# Patient Record
Sex: MALE | URBAN - METROPOLITAN AREA
[De-identification: names, ages, dates, MRNs, and addresses within clinical notes are randomized per-mention and may not be internally consistent; named-entity substitution may affect disease eponyms.]

---

## 2023-10-16 ENCOUNTER — OFFICE VISIT (OUTPATIENT)
Dept: ORTHOPEDICS | Facility: CLINIC | Age: 38
End: 2023-10-16
Payer: COMMERCIAL

## 2023-10-16 ENCOUNTER — TELEPHONE (OUTPATIENT)
Dept: ORTHOPEDICS | Facility: CLINIC | Age: 38
End: 2023-10-16

## 2023-10-16 DIAGNOSIS — S64.40XA LACERATION OF DIGITAL NERVE OF FINGER, INITIAL ENCOUNTER: Primary | ICD-10-CM

## 2023-10-16 PROCEDURE — 99203 OFFICE O/P NEW LOW 30 MIN: CPT | Mod: S$GLB,,, | Performed by: ORTHOPAEDIC SURGERY

## 2023-10-16 PROCEDURE — 99999 PR PBB SHADOW E&M-NEW PATIENT-LVL II: CPT | Mod: PBBFAC,,, | Performed by: ORTHOPAEDIC SURGERY

## 2023-10-16 PROCEDURE — 99203 PR OFFICE/OUTPT VISIT, NEW, LEVL III, 30-44 MIN: ICD-10-PCS | Mod: S$GLB,,, | Performed by: ORTHOPAEDIC SURGERY

## 2023-10-16 PROCEDURE — 99999 PR PBB SHADOW E&M-NEW PATIENT-LVL II: ICD-10-PCS | Mod: PBBFAC,,, | Performed by: ORTHOPAEDIC SURGERY

## 2023-10-16 RX ORDER — MUPIROCIN 20 MG/G
OINTMENT TOPICAL
Status: CANCELLED | OUTPATIENT
Start: 2023-10-16

## 2023-10-16 NOTE — TELEPHONE ENCOUNTER
----- Message from Sejal Slater sent at 10/16/2023  2:14 PM CDT -----  Contact: pt [phone]  Type: Needs Medical Advice  Who Called:  Pts     Best Call Back Number: [phone]    Additional Information: Pt asking if you can pls check insurance to make sure his surgery is approved. 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 Pls call back and advise

## 2023-10-16 NOTE — H&P (VIEW-ONLY)
10/16/2023    Chief Complaint:  Chief Complaint   Patient presents with    Left Hand - Injury, Pain       HPI:  Elieser Kumra is a 37 y.o. male, who presents to clinic today has a history of a laceration to his left small finger.  He was noted have numbness on 1 side of the finger and therefore he presents to me today.  He can flex and extend the finger.  He did not have any other injuries or other complaints at this time.    PMHX:  No past medical history on file.    PSHX:  No past surgical history on file.    FMHX:  No family history on file.    SOCHX:  Social History     Tobacco Use    Smoking status: Not on file    Smokeless tobacco: Not on file   Substance Use Topics    Alcohol use: Not on file       ALLERGIES:  Patient has no known allergies.    CURRENT MEDICATIONS:  No current outpatient medications on file prior to visit.     No current facility-administered medications on file prior to visit.       REVIEW OF SYSTEMS:  ROS    GENERAL PHYSICAL EXAM:   There were no vitals taken for this visit.   GEN: well developed, well nourished, no acute distress   HENT: Normocephalic, atraumatic   EYES: No discharge, conjunctiva normal   NECK: Supple, non-tender   PULM: No wheezing, no respiratory distress   CV: RRR   ABD: Soft, non-tender    ORTHO EXAM:   Examination of the left hand and small finger reveals that there is a 1.5-2 cm laceration at the base of the small finger on the palmar side.  There are sutures in place.  There is only minimal edema.  There is no erythema or drainage.  Distally has very limited to no sensation over the ulnar side of the digit.  The radial sided sensation is intact.  He is able to flex and extend at both of the IP joints.  He does have capillary refill less than 2 seconds at the tip of the finger.    RADIOLOGY:   None    ASSESSMENT:   Left small finger laceration with digital nerve laceration    PLAN:  1. I have discussed treatment options.  I have discussed observation versus  exploration of the wound possible nerve repair versus grafting.  After discussion of the risks and benefits of the procedure informed consent has been obtained     2. Will proceed with left hand and small finger wound exploration with nerve repair under general anesthesia     3. He will follow up 2 weeks postoperatively

## 2023-10-16 NOTE — TELEPHONE ENCOUNTER
Returned call to pt, informed we will put the case request in and let our prior auth department work on case. Pt stated that was fine that he thinks  is in network.

## 2023-10-16 NOTE — PROGRESS NOTES
10/16/2023    Chief Complaint:  Chief Complaint   Patient presents with    Left Hand - Injury, Pain       HPI:  Elieser Kumar is a 37 y.o. male, who presents to clinic today has a history of a laceration to his left small finger.  He was noted have numbness on 1 side of the finger and therefore he presents to me today.  He can flex and extend the finger.  He did not have any other injuries or other complaints at this time.    PMHX:  No past medical history on file.    PSHX:  No past surgical history on file.    FMHX:  No family history on file.    SOCHX:  Social History     Tobacco Use    Smoking status: Not on file    Smokeless tobacco: Not on file   Substance Use Topics    Alcohol use: Not on file       ALLERGIES:  Patient has no known allergies.    CURRENT MEDICATIONS:  No current outpatient medications on file prior to visit.     No current facility-administered medications on file prior to visit.       REVIEW OF SYSTEMS:  ROS    GENERAL PHYSICAL EXAM:   There were no vitals taken for this visit.   GEN: well developed, well nourished, no acute distress   HENT: Normocephalic, atraumatic   EYES: No discharge, conjunctiva normal   NECK: Supple, non-tender   PULM: No wheezing, no respiratory distress   CV: RRR   ABD: Soft, non-tender    ORTHO EXAM:   Examination of the left hand and small finger reveals that there is a 1.5-2 cm laceration at the base of the small finger on the palmar side.  There are sutures in place.  There is only minimal edema.  There is no erythema or drainage.  Distally has very limited to no sensation over the ulnar side of the digit.  The radial sided sensation is intact.  He is able to flex and extend at both of the IP joints.  He does have capillary refill less than 2 seconds at the tip of the finger.    RADIOLOGY:   None    ASSESSMENT:   Left small finger laceration with digital nerve laceration    PLAN:  1. I have discussed treatment options.  I have discussed observation versus  exploration of the wound possible nerve repair versus grafting.  After discussion of the risks and benefits of the procedure informed consent has been obtained     2. Will proceed with left hand and small finger wound exploration with nerve repair under general anesthesia     3. He will follow up 2 weeks postoperatively

## 2023-10-16 NOTE — PATIENT INSTRUCTIONS
Surgery Instructions:     Your surgery is scheduled on 10/26/23 [facility] [address] [facility].    The pre-op department will be in contact with you prior to your procedure to review medications and instructions.       Nothing to eat or drink after midnight prior to day of surgery.    The surgery center will contact you the day prior to surgery to advise you of your arrival time for surgery.     Your post op appointment is scheduled on 11/08/23 @ 1:00pm.

## 2023-10-25 ENCOUNTER — ANESTHESIA EVENT (OUTPATIENT)
Dept: SURGERY | Facility: HOSPITAL | Age: 38
End: 2023-10-25
Payer: COMMERCIAL

## 2023-10-26 ENCOUNTER — HOSPITAL ENCOUNTER (OUTPATIENT)
Facility: HOSPITAL | Age: 38
Discharge: HOME | End: 2023-10-26
Attending: ORTHOPAEDIC SURGERY | Admitting: ORTHOPAEDIC SURGERY
Payer: COMMERCIAL

## 2023-10-26 ENCOUNTER — ANESTHESIA (OUTPATIENT)
Dept: SURGERY | Facility: HOSPITAL | Age: 38
End: 2023-10-26
Payer: COMMERCIAL

## 2023-10-26 VITALS
WEIGHT: 205 LBS | DIASTOLIC BLOOD PRESSURE: 82 MMHG | HEIGHT: 73 IN | SYSTOLIC BLOOD PRESSURE: 131 MMHG | OXYGEN SATURATION: 99 % | BODY MASS INDEX: 27.17 KG/M2 | HEART RATE: 80 BPM | RESPIRATION RATE: 16 BRPM | TEMPERATURE: 97 F

## 2023-10-26 DIAGNOSIS — S61.209A FLEXOR TENDON LACERATION, FINGER, OPEN WOUND, INITIAL ENCOUNTER: Primary | ICD-10-CM

## 2023-10-26 DIAGNOSIS — S64.40XA LACERATION OF DIGITAL NERVE OF FINGER, INITIAL ENCOUNTER: ICD-10-CM

## 2023-10-26 DIAGNOSIS — S56.129A FLEXOR TENDON LACERATION, FINGER, OPEN WOUND, INITIAL ENCOUNTER: Primary | ICD-10-CM

## 2023-10-26 PROCEDURE — D9220A PRA ANESTHESIA: Mod: ANES,,, | Performed by: ANESTHESIOLOGY

## 2023-10-26 PROCEDURE — 71000015 HC POSTOP RECOV 1ST HR: Mod: PO | Performed by: ORTHOPAEDIC SURGERY

## 2023-10-26 PROCEDURE — 64912 PR NERVE REPAIR, W/NERVE ALLOGRAFT, 1ST STRAND: ICD-10-PCS | Mod: 51,LT,, | Performed by: ORTHOPAEDIC SURGERY

## 2023-10-26 PROCEDURE — D9220A PRA ANESTHESIA: ICD-10-PCS | Mod: CRNA,,, | Performed by: NURSE ANESTHETIST, CERTIFIED REGISTERED

## 2023-10-26 PROCEDURE — 25000003 PHARM REV CODE 250: Mod: PO | Performed by: ANESTHESIOLOGY

## 2023-10-26 PROCEDURE — 25000003 PHARM REV CODE 250: Mod: PO | Performed by: PHYSICIAN ASSISTANT

## 2023-10-26 PROCEDURE — 25000003 PHARM REV CODE 250: Mod: PO | Performed by: ORTHOPAEDIC SURGERY

## 2023-10-26 PROCEDURE — 37000009 HC ANESTHESIA EA ADD 15 MINS: Mod: PO | Performed by: ORTHOPAEDIC SURGERY

## 2023-10-26 PROCEDURE — 63600175 PHARM REV CODE 636 W HCPCS: Mod: PO | Performed by: ORTHOPAEDIC SURGERY

## 2023-10-26 PROCEDURE — 27200651 HC AIRWAY, LMA: Mod: PO | Performed by: ANESTHESIOLOGY

## 2023-10-26 PROCEDURE — 63600175 PHARM REV CODE 636 W HCPCS: Mod: PO | Performed by: NURSE ANESTHETIST, CERTIFIED REGISTERED

## 2023-10-26 PROCEDURE — D9220A PRA ANESTHESIA: ICD-10-PCS | Mod: ANES,,, | Performed by: ANESTHESIOLOGY

## 2023-10-26 PROCEDURE — D9220A PRA ANESTHESIA: Mod: CRNA,,, | Performed by: NURSE ANESTHETIST, CERTIFIED REGISTERED

## 2023-10-26 PROCEDURE — 36000706: Mod: PO | Performed by: ORTHOPAEDIC SURGERY

## 2023-10-26 PROCEDURE — 27800903 OPTIME MED/SURG SUP & DEVICES OTHER IMPLANTS: Mod: PO | Performed by: ORTHOPAEDIC SURGERY

## 2023-10-26 PROCEDURE — 64912 NRV RPR W/NRV ALGRFT 1ST: CPT | Mod: 51,LT,, | Performed by: ORTHOPAEDIC SURGERY

## 2023-10-26 PROCEDURE — 71000033 HC RECOVERY, INTIAL HOUR: Mod: PO | Performed by: ORTHOPAEDIC SURGERY

## 2023-10-26 PROCEDURE — 37000008 HC ANESTHESIA 1ST 15 MINUTES: Mod: PO | Performed by: ORTHOPAEDIC SURGERY

## 2023-10-26 PROCEDURE — 36000707: Mod: PO | Performed by: ORTHOPAEDIC SURGERY

## 2023-10-26 PROCEDURE — 25000003 PHARM REV CODE 250: Mod: PO | Performed by: NURSE ANESTHETIST, CERTIFIED REGISTERED

## 2023-10-26 PROCEDURE — 63600175 PHARM REV CODE 636 W HCPCS: Mod: PO | Performed by: ANESTHESIOLOGY

## 2023-10-26 PROCEDURE — 26356 REPAIR FINGER/HAND TENDON: CPT | Mod: LT,,, | Performed by: ORTHOPAEDIC SURGERY

## 2023-10-26 PROCEDURE — 63600175 PHARM REV CODE 636 W HCPCS: Mod: PO | Performed by: PHYSICIAN ASSISTANT

## 2023-10-26 PROCEDURE — 26356 PR REPAIR FLEX TENDON,ZONE 2,HAND: ICD-10-PCS | Mod: LT,,, | Performed by: ORTHOPAEDIC SURGERY

## 2023-10-26 DEVICE — IMPLANTABLE DEVICE: Type: IMPLANTABLE DEVICE | Site: FINGER | Status: FUNCTIONAL

## 2023-10-26 RX ORDER — MIDAZOLAM HYDROCHLORIDE 1 MG/ML
INJECTION, SOLUTION INTRAMUSCULAR; INTRAVENOUS
Status: DISCONTINUED | OUTPATIENT
Start: 2023-10-26 | End: 2023-10-26

## 2023-10-26 RX ORDER — SODIUM CHLORIDE, SODIUM LACTATE, POTASSIUM CHLORIDE, CALCIUM CHLORIDE 600; 310; 30; 20 MG/100ML; MG/100ML; MG/100ML; MG/100ML
INJECTION, SOLUTION INTRAVENOUS CONTINUOUS
Status: DISPENSED | OUTPATIENT
Start: 2023-10-26

## 2023-10-26 RX ORDER — PROPOFOL 10 MG/ML
VIAL (ML) INTRAVENOUS
Status: DISCONTINUED | OUTPATIENT
Start: 2023-10-26 | End: 2023-10-26

## 2023-10-26 RX ORDER — DEXAMETHASONE SODIUM PHOSPHATE 4 MG/ML
INJECTION, SOLUTION INTRA-ARTICULAR; INTRALESIONAL; INTRAMUSCULAR; INTRAVENOUS; SOFT TISSUE
Status: DISCONTINUED | OUTPATIENT
Start: 2023-10-26 | End: 2023-10-26

## 2023-10-26 RX ORDER — LIDOCAINE HYDROCHLORIDE 10 MG/ML
1 INJECTION, SOLUTION EPIDURAL; INFILTRATION; INTRACAUDAL; PERINEURAL ONCE
Status: ACTIVE | OUTPATIENT
Start: 2023-10-26

## 2023-10-26 RX ORDER — CEFAZOLIN SODIUM 2 G/50ML
2 SOLUTION INTRAVENOUS
Status: COMPLETED | OUTPATIENT
Start: 2023-10-26 | End: 2023-10-26

## 2023-10-26 RX ORDER — ONDANSETRON 2 MG/ML
4 INJECTION INTRAMUSCULAR; INTRAVENOUS DAILY PRN
Status: ACTIVE | OUTPATIENT
Start: 2023-10-26

## 2023-10-26 RX ORDER — ACETAMINOPHEN 10 MG/ML
INJECTION, SOLUTION INTRAVENOUS
Status: DISCONTINUED | OUTPATIENT
Start: 2023-10-26 | End: 2023-10-26

## 2023-10-26 RX ORDER — OXYCODONE HYDROCHLORIDE 5 MG/1
5 TABLET ORAL ONCE AS NEEDED
Status: COMPLETED | OUTPATIENT
Start: 2023-10-26 | End: 2023-10-26

## 2023-10-26 RX ORDER — FENTANYL CITRATE 50 UG/ML
25 INJECTION, SOLUTION INTRAMUSCULAR; INTRAVENOUS EVERY 5 MIN PRN
Status: COMPLETED | OUTPATIENT
Start: 2023-10-26 | End: 2023-10-26

## 2023-10-26 RX ORDER — LIDOCAINE HYDROCHLORIDE 10 MG/ML
INJECTION, SOLUTION EPIDURAL; INFILTRATION; INTRACAUDAL; PERINEURAL
Status: DISCONTINUED | OUTPATIENT
Start: 2023-10-26 | End: 2023-10-26 | Stop reason: HOSPADM

## 2023-10-26 RX ORDER — MUPIROCIN 20 MG/G
OINTMENT TOPICAL
Status: DISCONTINUED | OUTPATIENT
Start: 2023-10-26 | End: 2023-10-26 | Stop reason: HOSPADM

## 2023-10-26 RX ORDER — OXYCODONE HYDROCHLORIDE 5 MG/1
5 TABLET ORAL EVERY 4 HOURS PRN
Qty: 10 TABLET | Refills: 0 | Status: SHIPPED | OUTPATIENT
Start: 2023-10-26

## 2023-10-26 RX ORDER — BUPIVACAINE HYDROCHLORIDE 5 MG/ML
INJECTION, SOLUTION EPIDURAL; INTRACAUDAL
Status: DISCONTINUED | OUTPATIENT
Start: 2023-10-26 | End: 2023-10-26 | Stop reason: HOSPADM

## 2023-10-26 RX ORDER — ONDANSETRON 8 MG/1
8 TABLET, ORALLY DISINTEGRATING ORAL EVERY 8 HOURS PRN
Qty: 3 TABLET | Refills: 0 | Status: SHIPPED | OUTPATIENT
Start: 2023-10-26

## 2023-10-26 RX ORDER — KETOROLAC TROMETHAMINE 30 MG/ML
INJECTION, SOLUTION INTRAMUSCULAR; INTRAVENOUS
Status: DISCONTINUED | OUTPATIENT
Start: 2023-10-26 | End: 2023-10-26

## 2023-10-26 RX ORDER — LIDOCAINE HYDROCHLORIDE 20 MG/ML
INJECTION INTRAVENOUS
Status: DISCONTINUED | OUTPATIENT
Start: 2023-10-26 | End: 2023-10-26

## 2023-10-26 RX ORDER — SODIUM CHLORIDE, SODIUM LACTATE, POTASSIUM CHLORIDE, CALCIUM CHLORIDE 600; 310; 30; 20 MG/100ML; MG/100ML; MG/100ML; MG/100ML
125 INJECTION, SOLUTION INTRAVENOUS CONTINUOUS
Status: DISPENSED | OUTPATIENT
Start: 2023-10-26

## 2023-10-26 RX ORDER — FENTANYL CITRATE 50 UG/ML
INJECTION, SOLUTION INTRAMUSCULAR; INTRAVENOUS
Status: DISCONTINUED | OUTPATIENT
Start: 2023-10-26 | End: 2023-10-26

## 2023-10-26 RX ORDER — ONDANSETRON 2 MG/ML
INJECTION INTRAMUSCULAR; INTRAVENOUS
Status: DISCONTINUED | OUTPATIENT
Start: 2023-10-26 | End: 2023-10-26

## 2023-10-26 RX ADMIN — FENTANYL CITRATE 25 MCG: 50 INJECTION INTRAMUSCULAR; INTRAVENOUS at 12:10

## 2023-10-26 RX ADMIN — CEFAZOLIN SODIUM 2 G: 2 SOLUTION INTRAVENOUS at 09:10

## 2023-10-26 RX ADMIN — DEXAMETHASONE SODIUM PHOSPHATE 4 MG: 4 INJECTION, SOLUTION INTRAMUSCULAR; INTRAVENOUS at 10:10

## 2023-10-26 RX ADMIN — ONDANSETRON 4 MG: 2 INJECTION, SOLUTION INTRAMUSCULAR; INTRAVENOUS at 10:10

## 2023-10-26 RX ADMIN — PROPOFOL 200 MG: 10 INJECTION, EMULSION INTRAVENOUS at 09:10

## 2023-10-26 RX ADMIN — FENTANYL CITRATE 50 MCG: 50 INJECTION, SOLUTION INTRAMUSCULAR; INTRAVENOUS at 10:10

## 2023-10-26 RX ADMIN — ACETAMINOPHEN 1000 MG: 10 INJECTION, SOLUTION INTRAVENOUS at 10:10

## 2023-10-26 RX ADMIN — MUPIROCIN: 20 OINTMENT TOPICAL at 09:10

## 2023-10-26 RX ADMIN — MIDAZOLAM HYDROCHLORIDE 2 MG: 1 INJECTION, SOLUTION INTRAMUSCULAR; INTRAVENOUS at 09:10

## 2023-10-26 RX ADMIN — SODIUM CHLORIDE, POTASSIUM CHLORIDE, SODIUM LACTATE AND CALCIUM CHLORIDE: 600; 310; 30; 20 INJECTION, SOLUTION INTRAVENOUS at 09:10

## 2023-10-26 RX ADMIN — FENTANYL CITRATE 50 MCG: 50 INJECTION, SOLUTION INTRAMUSCULAR; INTRAVENOUS at 09:10

## 2023-10-26 RX ADMIN — LIDOCAINE HYDROCHLORIDE 75 MG: 20 INJECTION INTRAVENOUS at 09:10

## 2023-10-26 RX ADMIN — OXYCODONE HYDROCHLORIDE 5 MG: 5 TABLET ORAL at 12:10

## 2023-10-26 RX ADMIN — KETOROLAC TROMETHAMINE 30 MG: 30 INJECTION, SOLUTION INTRAMUSCULAR at 10:10

## 2023-10-26 NOTE — DISCHARGE INSTRUCTIONS
Procedure:  Left small finger flexor tendon repair and digital nerve repair     1. Please keep the splint clean and dry.  Do not take it off and do not get it wet.      2. Please do not attempt to lift or push off with the left arm or hand.      3. Please do not attempt to flex or extend the fingers on the left hand.      4. Pain medication and nausea medication has been prescribed.  Please take them as necessary    5. If you have any questions or concerns please call Dr. Ortez's office at [phone]    6. Occupational therapy should start within the next 7-10 days.  If you have not been contacted by occupational therapy please contact Dr. Ortez    7.  He will follow up with Dr. Ortez in 2 weeks

## 2023-10-26 NOTE — TRANSFER OF CARE
"Anesthesia Transfer of Care Note    Patient: Elieser Kumar    Procedure(s) Performed: Procedure(s) (LRB):  Left small finger nerve repair (Left)  REPAIR, TENDON, FINGER (Left)    Patient location: PACU    Anesthesia Type: general    Transport from OR: Transported from OR on room air with adequate spontaneous ventilation    Post pain: adequate analgesia    Post assessment: no apparent anesthetic complications and tolerated procedure well    Post vital signs: stable    Level of consciousness: awake    Nausea/Vomiting: no nausea/vomiting    Complications: none    Transfer of care protocol was followed      Last vitals:   Visit Vitals  /61   Pulse 79   Temp 36.3 °C (97.3 °F) (Skin)   Resp 14   Ht 6' 1" (1.854 m)   Wt 93 kg (205 lb)   SpO2 98%   BMI 27.05 kg/m²     "

## 2023-10-26 NOTE — ANESTHESIA POSTPROCEDURE EVALUATION
Anesthesia Post Evaluation    Patient: Elieser Kumar    Procedure(s) Performed: Procedure(s) (LRB):  Left small finger nerve repair (Left)  REPAIR, TENDON, FINGER (Left)    Final Anesthesia Type: general      Patient location during evaluation: PACU  Patient participation: Yes- Able to Participate  Level of consciousness: awake and alert and oriented  Post-procedure vital signs: reviewed and stable  Pain management: adequate  Airway patency: patent  CITLALLI mitigation strategies: Multimodal analgesia and Extubation while patient is awake  PONV status at discharge: No PONV  Anesthetic complications: no      Cardiovascular status: blood pressure returned to baseline  Respiratory status: unassisted, spontaneous ventilation and room air  Hydration status: euvolemic  Follow-up not needed.          Vitals Value Taken Time   /82 10/26/23 1253   Temp 36.3 °C (97.3 °F) 10/26/23 1253   Pulse 80 10/26/23 1253   Resp 16 10/26/23 1253   SpO2 99 % 10/26/23 1253         Event Time   Out of Recovery 12:07:32         Pain/Cedrick Score: Pain Rating Prior to Med Admin: 4 (10/26/2023 12:35 PM)  Pain Rating Post Med Admin: 2 (10/26/2023 12:56 PM)  Cedrick Score: 10 (10/26/2023 12:56 PM)

## 2023-10-26 NOTE — ANESTHESIA PROCEDURE NOTES
Intubation    Date/Time: 10/26/2023 9:41 AM    Performed by: Crow Cortés CRNA  Authorized by: Riaz Aleman MD    Intubation:     Induction:  Intravenous    Intubated:  Postinduction    Mask Ventilation:  Easy mask    Attempts:  1    Attempted By:  CRNA    Difficult Airway Encountered?: No      Airway Device:  Supraglottic airway/LMA    Airway Device Size:  5.0    Style/Cuff Inflation:  Cuffed    Inflation Amount (mL):  38    Secured at:  The lips    Placement Verified By:  Capnometry    Complicating Factors:  None    Findings Post-Intubation:  BS equal bilateral and atraumatic/condition of teeth unchanged

## 2023-10-26 NOTE — PROGRESS NOTES
Patients consent did not state wound exploration, surgery schedule does. MD notified, stated it didn't need to be written in. OR manager made aware.

## 2023-10-26 NOTE — ANESTHESIA PREPROCEDURE EVALUATION
10/26/2023  Elieser Kumar is a 37 y.o., male.      Pre-op Assessment    I have reviewed the NPO Status.   I have reviewed the Medications.     Review of Systems  Anesthesia Hx:  No problems with previous Anesthesia    Social:  Non-Smoker    Cardiovascular:   Exercise tolerance: good    Pulmonary:  Pulmonary Normal    Hepatic/GI:  Hepatic/GI Normal    Neurological:   Neuromuscular Disease,    Endocrine:  Endocrine Normal        Physical Exam  General: Well nourished    Airway:  Mallampati: II   Mouth Opening: Normal  TM Distance: Normal  Tongue: Normal  Neck ROM: Normal ROM    Dental:  Intact    Chest/Lungs:  Clear to auscultation, Normal Respiratory Rate        Anesthesia Plan  Type of Anesthesia, risks & benefits discussed:    Anesthesia Type: Gen Supraglottic Airway  Intra-op Monitoring Plan: Standard ASA Monitors  Post Op Pain Control Plan: multimodal analgesia and IV/PO Opioids PRN  Induction:  IV  Airway Plan: Direct  Informed Consent: Informed consent signed with the Patient and all parties understand the risks and agree with anesthesia plan.  All questions answered. Patient consented to blood products? No  ASA Score: 1    Ready For Surgery From Anesthesia Perspective.     .

## 2023-10-30 NOTE — DISCHARGE SUMMARY
[facility] - Surgery  Discharge Note  Short Stay    Procedure(s) (LRB):  Left small finger nerve repair (Left)  REPAIR, TENDON, FINGER (Left)      OUTCOME: Patient tolerated treatment/procedure well without complication and is now ready for discharge.    DISPOSITION: Home or Self Care    FINAL DIAGNOSIS:  Laceration of digital nerve of finger    FOLLOWUP: In clinic    DISCHARGE INSTRUCTIONS:    Discharge Procedure Orders   [facility] FOR HOME USE     Ambulatory referral/consult to Physical/Occupational Therapy   Standing Status: Future   Referral Priority: Routine Referral Type: Physical Medicine   Referral Reason: Specialty Services Required   Number of Visits Requested: 1     Diet general     Activity as tolerated     Keep surgical extremity elevated     Lifting restrictions   Order Comments: Please do not lift or push off with the left arm or hand.    Please do not attempt to flex or extend the fingers on the left hand     Leave dressing on - Keep it clean, dry, and intact until clinic visit     Call MD for:  temperature >100.4     Call MD for:  persistent nausea and vomiting     Call MD for:  severe uncontrolled pain     Call MD for:  difficulty breathing, headache or visual disturbances     Call MD for:  redness, tenderness, or signs of infection (pain, swelling, redness, odor or green/yellow discharge around incision site)     Call MD for:  hives     Call MD for:  persistent dizziness or light-headedness     Call MD for:  extreme fatigue        TIME SPENT ON DISCHARGE: 15 minutes

## 2023-10-30 NOTE — OP NOTE
Elieser Kumar  1985    DATE OF SURGERY: 10/26/23    PRE-OPERATIVE DIAGNOSIS:  Left small finger laceration with digital nerve laceration    POST-OPERATIVE DIAGNOSIS:  Left small finger laceration with ulnar digital nerve laceration, flexor digitorum profundus laceration, flexor digitorum superficialis laceration     ANESTHESIA TYPE:  General    BLOOD LOSS:  Less than 10 cc    TOURNIQUET TIME:  Approximately 90 minutes    SURGEON: Dr Ortez    ASSISTANT:  Kemal Cummins    PROCEDURE:   1. Left small finger wound exploration   2. Left small finger flexor digitorum profundus tendon repair in zone 2  3.  Left small finger flexor digitorum superficialis tendon repair in zone 2  4.  Left small finger ulnar digital nerve repair with interposition nerve allograft      IMPLANTS:  AxoGen nerve graft size 2-3 mm x 30 mm x 1     SPECIMENS:  None    INDICATION:     Mr. Kumar presented to my clinic after sustaining a laceration to his left small finger.  He was able to flex and extend the finger but had complete numbness over the ulnar side of the digit.  Due to the numbness we discussed the possibility of wound exploration with repair of lacerated structures.  Informed consent was then obtained    PROCEDURE IN DETAIL:     Mr. Kumar was transported to the operating room and was placed supine on the operating room table. All appropriate points were padded. The left arm and hand was prepped and draped in the normal sterile fashion. Time out was called. The correct patient, correct operative site, correct procedure, antibiotic administration which consisted of 2 g of Ancef, and allergies to medications which are to Patient has no known allergies.  were reviewed. Time in was then called.     Attention was turned to the left small finger.  There was noted be a 1 cm incision overlying the ulnar border of the small finger.  The incision was extended proximally and distally in a Brunner type fashion.  The incision was  carried through the skin.  The subcutaneous tissues were explored.  The wound was then explored.  There was noted to be a complete laceration of the ulnar digital nerve.  There is approximately a 1.5-2 cm gap within the nerve.  This point the nerve edges were trimmed back to healthy fascicles.  The wound was then irrigated.  Further exploration of the wound did reveal a puncture through the flexor tendon sheath.  This was in the level of the cruciate between the A2 and A4 pulleys.  The flexor tendons were visualized.  There was noted to be a complete disruption of the ulnar slip of the FDS with a 90% disruption of the radial sided slip of the FDS.  The FDP tendon was approximately 60% lacerated.  The tendon edges were cleaned.  The radial sided slip of the flexor digitorum profundus tendon was then repaired with 3-0 Ethibond suture in a modified Posada configuration.  A 2 strand repair was performed.  Attention was then turned to the flexor digitorum profundus tendon.  The edges of the tendon were cleared and debrided.  The tendon was then repaired with a 4 strand modified Posada suture configuration using 3-0 Ethibond.  A epitendinous stitch was then placed using 5 0 Prolene suture.  With the tendon repairs completed attention was turned to the digital nerve.  An AxoGen nerve graft was selected.  It was trimmed to the appropriate length.  It was position between the nerve ends.  There was noted to be a exactly 2 cm gap.  The nerve was repaired proximally and distally using a 8-0 nylon suture.  With the epineurial repair completed there was noted to be no gapping and no over tightness.  The finger was taken through range of motion there was noted to be no tension at either of the repair site.  The wound was then copiously irrigated.  The tourniquet was let down and hemostasis was obtained.  The wound was closed with 4-0 nylon suture.  The wound was dressed with Xeroform, gauze padding, cast padding and a short-arm  dorsal blocking splint was placed    The patient was awakened from anesthesia and was transported to the recovery room in stable condition. All lap, needle, sponge, and equipment counts were correct at the end of the case.    POST-OPERATIVE PLAN:     Patient will keep the splint in place for 1 week at which time he will follow up with therapy.  A thermoplastic splint will be made at that time and he will begin the flexor tendon repair protocol.

## 2023-11-02 ENCOUNTER — CLINICAL SUPPORT (OUTPATIENT)
Dept: REHABILITATION | Facility: HOSPITAL | Age: 38
End: 2023-11-02
Attending: ORTHOPAEDIC SURGERY
Payer: COMMERCIAL

## 2023-11-02 DIAGNOSIS — S61.209A FLEXOR TENDON LACERATION, FINGER, OPEN WOUND, INITIAL ENCOUNTER: ICD-10-CM

## 2023-11-02 DIAGNOSIS — R29.898 DECREASED GRIP STRENGTH OF LEFT HAND: ICD-10-CM

## 2023-11-02 DIAGNOSIS — S64.40XA LACERATION OF DIGITAL NERVE OF FINGER, INITIAL ENCOUNTER: ICD-10-CM

## 2023-11-02 DIAGNOSIS — R29.898 DECREASED PINCH STRENGTH: ICD-10-CM

## 2023-11-02 DIAGNOSIS — M79.642 HAND PAIN, LEFT: Primary | ICD-10-CM

## 2023-11-02 DIAGNOSIS — M25.642 DECREASED RANGE OF MOTION OF FINGER OF LEFT HAND: ICD-10-CM

## 2023-11-02 DIAGNOSIS — S56.129A FLEXOR TENDON LACERATION, FINGER, OPEN WOUND, INITIAL ENCOUNTER: ICD-10-CM

## 2023-11-02 DIAGNOSIS — Z78.9 ALTERATION IN INSTRUMENTAL ACTIVITIES OF DAILY LIVING (IADL): ICD-10-CM

## 2023-11-02 PROCEDURE — L3808 WHFO, RIGID W/O JOINTS: HCPCS | Mod: PO

## 2023-11-02 PROCEDURE — 97110 THERAPEUTIC EXERCISES: CPT | Mod: PO

## 2023-11-02 PROCEDURE — 97167 OT EVAL HIGH COMPLEX 60 MIN: CPT | Mod: PO

## 2023-11-02 NOTE — PLAN OF CARE
[facility]  Occupational Therapy Initial Evaluation     Name: Elieser Kumar  Clinic Number: [id number]    Therapy Diagnosis:    Left hand pain, decreased ROM L hand, decreased /pinch/ADL/IADL  Physician: Eric Ortez MD    Physician Orders: Please begin therapy to the left hand and small finger.  Zone 2 flexor tendon repair protocol.  Please pay attention and protect the digital nerve repair as well.  Please create a thermoplastic custom dorsal blocking splint     Frequency:  3 times per week   Duration:  6 weeks      Diagnosis: Status post left small finger FDS and FDP repairs in zone 2  Medical Diagnosis:   S64.40XA (ICD-10-CM) - Laceration of digital nerve of finger, initial encounter   S56.129A,S61.209A (ICD-10-CM) - Flexor tendon laceration, finger, open wound, initial encounter       Evaluation Date: 11/2/2023  Insurance Authorization Period Expiration: 10/25/2024  Plan of Care Certification Period: 1/31/2024  Date of Return to MD: 11/8/2023  Visit # / Visits authorized: 1 / 1  FOTO: Initial/4    Time In:0702  Time Out: 0755  Total Appointment Time (timed & untimed codes): 53 minutes    Surgical Procedure:   L SF FDS, FDP, and UDN repair     Precautions: Standard and Weightbearing and flexor tendon precautions    Date of Surgery: 10/26/23   S/P: 1 week on 11/2/23    Subjective     Date of Onset: 10/16/23    History of Current Condition/Mechanism of Injury:  Pt was lifting a procelain objects that broke and lacerated finger. He saw Dr. Ortez on 10/16/23 due to persistent numbness in finger. Sx performed approx 10 days later and lacerations to FDS and FDP were discovered.     Falls: n/a    Involved Side: Left   Dominant Side:  Left     Imaging: X rays: None    Prior Therapy: none    Pain:  Functional Pain Scale Rating 0-10:   2/10 now  0/10 at best  6/10 at worst  Location: L SF into palm and wrist  Description: Throbbing  Aggravating Factors: TBA  Easing  Factors: TBA    Occupation:  Emergency physician  Working presently: yes  Duties: writing, central lines,     Functional Limitations/Social History:    Previous functional status includes: Independent with all ADLs.     Current Functional Status   Home/Living environment: lives with their family         Limitation of Functional Status as follows:      ADLs/IADLs: Pt is not allowed to use L UE for ADLs and IADLs due to post operative precautions.  Difficulty with writing, lifting, carrying, performing work tasks (central lines, etc.)    - Feeding:  Difficulty cutting food    - Bathing:  Difficulty washing hair, R UE    - Dressing/Grooming:  Difficulty pulling clothing on, managing fasteners, tieing drawstrings.     - Driving: limited use of L,  using R      Leisure: Fishing, Hunting, and Golfing    Patient's Goals for Therapy: resume to PLOF L UE    Past Medical History/Physical Systems Review:   Elieser Kumar  has no past medical history on file.    Elieser Kumar  has a past surgical history that includes chin surgery; Lumbar disc surgery; Repair of nerve of finger (Left, 10/26/2023); and Finger tendon repair (Left, 10/26/2023).    Elieser has a current medication list which includes the following prescription(s): multivitamin, ondansetron, and oxycodone, and the following Facility-Administered Medications: lactated ringers, lactated ringers, lidocaine (pf) 10 mg/ml (1%), and ondansetron.    Review of patient's allergies indicates:  No Known Allergies     Objective     Observation/Appearance:   Presents with post op dressing clean and intact.     Sensation: to be assessed     Incision: Sutures intact.Incision healing well with no signs or symptoms of infection observed.        Edema:  Mild to mod edema throughout L SF. Not measured due to sutures.         Range of Motion:  Not tested this date.  PROM to be assessed next visit.                         Manual Muscle Test:  Not tested                              "           Special Tests: none performed       CMS Impairment/Limitation/Restriction for FOTO  Survey    Therapist reviewed FOTO scores for Elieser Kumar on 11/2/2023.   FOTO documents entered into [employer] - see Media section.    Initial Score: 4  Predicted Score: 54 at visit 20.          Treatment     Total Treatment time separate from Evaluation time: 40 min      -Removed post op dressing. Incision cleansed with wound cleanser. Applied Adaptic and 1" roll gauze. 5 min     : Fabricated custom forearm based dorsal blocking orthosis with wrist in 20 degrees flexion, MPJ and PIPJs in 30-40 degrees of flexion. Care taken to ensure SF PIPJ is positioned in orthosis with 30 degrees of flexion to protect UDN repair. Orthosis with good fit. Pt educated on wear, care, and precautions of orthosis. Written instructions issued. Orthosis for continuous wear.     Alexandr received therapeutic exercises to increase ROM, endurance, and/or strength for 10 minutes including:  -PROM L RF DIPJ E/F 25 reps  -PROM L RF PIPJ E/F 25 reps  -PROM composite RF flexion 25 reps  -PROM composite flexion all fingers followed by active extension to DBS 25 reps    Patient Education and Home Exercises      Education provided:   -role of OT, goals for OT,   -Additional Education provided: post operative precautions and timeframes, protecting nerve repair during exercise by ensuring SF is positioned properly in orthosis so PIPJ extension is blocked.     Written Home Exercises Provided:   Exercises were reviewed and Alexandr was able to demonstrate them prior to the end of the session.    Alexandr demonstrated good understanding of the education provided.     Pt was advised to perform these exercises free of pain, and to stop performing them if pain occurs.    See EMR under Patient Instructions for exercises provided 11/2/2023.    Assessment     Elieser Kumar is a 37 y.o. male referred to outpatient occupational therapy and presents with a medical " diagnosis of 1 week s/p L SF FDS, FDP, and UDN repair,  resulting in Paresthesias, pain, edema, decreased A/PROM, strength, and functional use of L dominant UE.    Following medical record review it is determined that pt will benefit from occupational therapy services in order to maximize pain free and/or functional use of left UE.  The following goals were discussed with the patient and patient is in agreement with them as to be addressed in the treatment plan. The patient's rehab potential is Good.    Anticipated barriers to occupational therapy: none    Plan of care discussed with patient: Yes  Patient's spiritual, cultural and educational needs considered and patient is agreeable to the plan of care and goals as stated below:     Medical Necessity is demonstrated by the following  Occupational Profile/History  Co-morbidities and personal factors that may impact the plan of care [] LOW: Brief chart review  [x] MODERATE: Expanded chart review   [] HIGH: Extensive chart review    Moderate / High Support Documentation: reviewed referral,  MD appointments, op report     Examination  Performance deficits relating to physical, cognitive or psychosocial skills that result in activity limitations and/or participation restrictions  [] LOW: addressing 1-3 Performance deficits  [] MODERATE: 3-5 Performance deficits  [x] HIGH: 5+ Performance deficits (please support below)    Moderate / High Support Documentation:     Physical:  Joint Mobility  Muscle Power/Strength  Muscle Endurance  Skin Integrity/Scar Formation  Edema   Strength  Pinch Strength  Fine Motor Coordination  Pain  Numbness/paresthesias    Cognitive:  Decreased knowledge of post operative precautions    Psychosocial:    No Deficits     Treatment Options [] LOW: Limited options  [] MODERATE: Several options  [x] HIGH: Multiple options      Decision Making/ Complexity Score: high       The following goals were discussed with the patient and patient is in  agreement with them as to be addressed in the treatment plan.     Goals:   Short Term Goals: (4 weeks)  1.Pt will be independent with HEP in 2 visits.  2. Pt will be compliant with splint wear per protocol.   3. Pt will report decreased pain to a 4-5/10 at worst.  4. Pt will exhibit full PROM of L SF to DPC.   5. Pt will  increase place/hold flexion LING by 10 -20 degrees at L SF to facilitate  future functional grasp once allowed post operatively.   6. Pt will increase LING L SF by 30-40 degrees once AROM initiated.     Long Term Goals: (by discharge)  1. Pt will report decreased pain to 1-2/10 with ADLs.   2. Pt will make a full, flat, composite fist to enable grasping and squeezing objects for self-care.  3. Pt will exhibit L  strength at 75%+ of R to allow a firm grasp on cooking utensils, steering wheel, work tools, etc.  4. Pt will exhibit L functional pinch strength at 75%+ of R comparative measures to allow writing, opening containers, tieing knots, pulling drawstrings, and turning keys.  5. Pt will exhibit a significant increase (50+ points) in the FOTO assessment.  6. Pt will return to PLOF.     Plan   Certification Period/Plan of care expiration: 11/2/2023 to 1/31/2024.    Outpatient Occupational Therapy 3 times weekly for 6 weeks to include the following interventions:     Modalities to decrease pain (moist heat, paraffin, fluidotherapy, US ), edema control, scar mobilization, soft tissue mobilization, manual therapy/joint mobilizations,A/PROM, therapeutic exercises/activities, strengthening, desensitization/sensory re-education, orthotic fitting/fabrication/training PRN, joint protections/energry conservation/adaptive equipment/activity modification ,  HEP/education as well as any other treatments deemed necessary based on the patient's needs or progress.    Updates Next Visit: review HEP, progress per flexor tendon protocol.    IDALIA Salmeron, ANNE MARIET

## 2023-11-03 ENCOUNTER — PATIENT MESSAGE (OUTPATIENT)
Dept: REHABILITATION | Facility: HOSPITAL | Age: 38
End: 2023-11-03
Payer: COMMERCIAL

## 2023-11-07 NOTE — PROGRESS NOTES
[facility]  Occupational Therapy Treatment Note     Date: 11/8/2023  Name: Elieser Kumar  Clinic Number: [id number]    Therapy Diagnosis:    Left hand pain, decreased ROM L hand, decreased /pinch/ADL/IADL  Physician: Eric Ortez MD     Physician Orders: Please begin therapy to the left hand and small finger.  Zone 2 flexor tendon repair protocol.  Please pay attention and protect the digital nerve repair as well.  Please create a thermoplastic custom dorsal blocking splint     Frequency:  3 times per week   Duration:  6 weeks      Diagnosis: Status post left small finger FDS and FDP repairs in zone 2  Medical Diagnosis:   S64.40XA (ICD-10-CM) - Laceration of digital nerve of finger, initial encounter   S56.129A,S61.209A (ICD-10-CM) - Flexor tendon laceration, finger, open wound, initial encounter         Evaluation Date: 11/2/2023  Insurance Authorization Period Expiration: 10/25/2024  Plan of Care Certification Period: 1/31/2024  Date of Return to MD: 11/8/2023  Visit # / Visits authorized: 2 / pending  FOTO: Initial/4     Time In:0816  Time Out: 0847  Total Appointment Time (timed & untimed codes): 31 minutes     Surgical Procedure:   L SF FDS, FDP, and UDN repair      Precautions: Standard and Weightbearing and flexor tendon precautions     Date of Surgery: 10/26/23                             S/P: 13 days on  on 11/8/23      Subjective     Pt reports: he feels that sensation is already improving along his ulnar hand along distal metacarpal into proximal phalanx. He has found a way to tighten drawstring waistbands and is using index finger and thumb as minimally as possible to write when necessary.    He was  compliant with home exercise program given last session.   Response to previous treatment:Good  Functional change: none allowed at this time    Pain:  Functional Pain Scale Rating 0-10:   2/10 now  0/10 at best  6/10 at worst  Location: L SF into palm and  wrist      Objective     Objective Measures updated at progress report unless specified.    PROM (pre-tx)  MP NT/45  PIP NT/66  DIP NT/45      Treatment     Alexandr received the treatments listed below:      Supervised Modalities after being cleared for contradictions:   -    Direct Contact Modalities after being cleared for contraindications:   -    Manual Therapy Techniques were applied for 5 minutes, including:  -gentle STM to yoni-incision areas to reduce edema  -Dry Retrograde massage to L digits/hand/wrist x 3 min to stimulate lymphatics to decrease pain, edema and increase AROM and functional use.       Therapeutic Exercises to develop  for  ROM, endurance, and/or strength for 26 min  -PROM L RF DIPJ E/F 25 reps  -PROM L RF PIPJ E/F 25 reps  -Prieto's PROM (25 reps (within limits of PIP restrictions due to UDN repair)  -PROM composite RF flexion 25 reps  -PROM composite flexion all fingers followed by active extension to DBS 25 reps   -gentle PROM wrist from 20 degrees of flexion to approx 5 degrees of flexion 25 reps by OT  -gentle passive tenodesis action from 20-0 degrees wrist flexion, 25 reps  by OT     Patient Education and Home Exercises     Education provided:   - continue same      Written Home Exercises Provided: Patient instructed to cont prior HEP.  Exercises were reviewed and Alexandr was able to demonstrate them prior to the end of the session.  Alexandr demonstrated good  understanding of the HEP provided. See EMR under Patient Instructions for exercises provided during therapy sessions.       Assessment     Pt would continue to benefit from skilled OT. Pt has MD appointment this date for suture removal. Edema noted to be decreased. Orthosis with good fit and good protection for UDN repair. Slight improvement reported in sensation along ulnar distal hand. Baseline passive flexion SF taken this date. Improved PROM by end of session. Pt is conscientious regarding post op precautions.     Alexandr is progressing  well towards his goals and there are no updates to goals at this time.   - Progress towards goals: Good; STG 1 and 2 met     Pt prognosis is Good.    Pt will continue to benefit from skilled outpatient occupational therapy to address the deficits listed in the problem list on initial evaluation provide pt/family education and to maximize pt's level of independence in the home and community environment.     Pt's spiritual, cultural and educational needs considered and pt agreeable to plan of care and goals.    Anticipated barriers to occupational therapy: none     Goals:  Short Term Goals: (4 weeks)  1.Pt will be independent with HEP in 2 visits. (Met 11/8/23)  2. Pt will be compliant with splint wear per protocol. (Met 11/8/23)  3. Pt will report decreased pain to a 4-5/10 at worst.  4. Pt will exhibit full PROM of L SF to DPC.   5. Pt will  increase place/hold flexion LING by 10 -20 degrees at L SF to facilitate  future functional grasp once allowed post operatively.   6. Pt will increase LING L SF by 30-40 degrees once AROM initiated.      Long Term Goals: (by discharge)  1. Pt will report decreased pain to 1-2/10 with ADLs.   2. Pt will make a full, flat, composite fist to enable grasping and squeezing objects for self-care.  3. Pt will exhibit L  strength at 75%+ of R to allow a firm grasp on cooking utensils, steering wheel, work tools, etc.  4. Pt will exhibit L functional pinch strength at 75%+ of R comparative measures to allow writing, opening containers, tieing knots, pulling drawstrings, and turning keys.  5. Pt will exhibit a significant increase (50+ points) in the FOTO assessment.  6. Pt will return to PLOF.     Plan     Certification Period/Plan of care expiration: 11/2/2023 to 1/31/2024.  Continue Occupational Therapy 3 times per week x 6 weeks to decrease pain and edema, and increase A/PROM, strength, and functional use of L upper extremity.     Updates/Grading for next session: progress per  protocol.    IDALIA Salmeron, ANNE MARIET

## 2023-11-08 ENCOUNTER — OFFICE VISIT (OUTPATIENT)
Dept: ORTHOPEDICS | Facility: CLINIC | Age: 38
End: 2023-11-08
Payer: COMMERCIAL

## 2023-11-08 ENCOUNTER — CLINICAL SUPPORT (OUTPATIENT)
Dept: REHABILITATION | Facility: HOSPITAL | Age: 38
End: 2023-11-08
Payer: COMMERCIAL

## 2023-11-08 DIAGNOSIS — M25.642 DECREASED RANGE OF MOTION OF FINGER OF LEFT HAND: ICD-10-CM

## 2023-11-08 DIAGNOSIS — S56.129A FLEXOR TENDON LACERATION, FINGER, OPEN WOUND, INITIAL ENCOUNTER: Primary | ICD-10-CM

## 2023-11-08 DIAGNOSIS — M79.642 HAND PAIN, LEFT: Primary | ICD-10-CM

## 2023-11-08 DIAGNOSIS — R29.898 DECREASED PINCH STRENGTH: ICD-10-CM

## 2023-11-08 DIAGNOSIS — S61.209A FLEXOR TENDON LACERATION, FINGER, OPEN WOUND, INITIAL ENCOUNTER: Primary | ICD-10-CM

## 2023-11-08 DIAGNOSIS — Z78.9 ALTERATION IN INSTRUMENTAL ACTIVITIES OF DAILY LIVING (IADL): ICD-10-CM

## 2023-11-08 DIAGNOSIS — S64.40XA LACERATION OF DIGITAL NERVE OF FINGER, INITIAL ENCOUNTER: ICD-10-CM

## 2023-11-08 DIAGNOSIS — R29.898 DECREASED GRIP STRENGTH OF LEFT HAND: ICD-10-CM

## 2023-11-08 PROCEDURE — 1159F MED LIST DOCD IN RCRD: CPT | Mod: CPTII,S$GLB,, | Performed by: ORTHOPAEDIC SURGERY

## 2023-11-08 PROCEDURE — 1159F PR MEDICATION LIST DOCUMENTED IN MEDICAL RECORD: ICD-10-PCS | Mod: CPTII,S$GLB,, | Performed by: ORTHOPAEDIC SURGERY

## 2023-11-08 PROCEDURE — 99024 POSTOP FOLLOW-UP VISIT: CPT | Mod: S$GLB,,, | Performed by: ORTHOPAEDIC SURGERY

## 2023-11-08 PROCEDURE — 97110 THERAPEUTIC EXERCISES: CPT | Mod: PO

## 2023-11-08 PROCEDURE — 99999 PR PBB SHADOW E&M-EST. PATIENT-LVL II: CPT | Mod: PBBFAC,,, | Performed by: ORTHOPAEDIC SURGERY

## 2023-11-08 PROCEDURE — 99999 PR PBB SHADOW E&M-EST. PATIENT-LVL II: ICD-10-PCS | Mod: PBBFAC,,, | Performed by: ORTHOPAEDIC SURGERY

## 2023-11-08 PROCEDURE — 99024 PR POST-OP FOLLOW-UP VISIT: ICD-10-PCS | Mod: S$GLB,,, | Performed by: ORTHOPAEDIC SURGERY

## 2023-11-08 NOTE — PROGRESS NOTES
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  He is status post left small finger wound exploration with FDS and FDP repair as well as digital nerve repair.  He is 2 weeks postop and is doing relatively well.  He has started working with therapy.      Physical exam: Examination left hand and small finger reveals that the incision is healing well.  There is only mild edema.  He does have capillary refill less than 2 seconds at the tip.  The finger is held in a flexed position.      Assessment: Status post left small finger wound exploration with FDS and FDP repairs as well as digital nerve repair     Plan:    1. Sutures were removed today and Steri-Strips are placed    2.  Will continue to wear his dorsal blocking splint    3.  Will follow up in 3 weeks for repeat evaluation

## 2023-11-10 NOTE — PROGRESS NOTES
[facility]  Occupational Therapy Treatment Note     Date: 11/13/2023  Name: Elieser Kumar  Clinic Number: [id number]    Therapy Diagnosis:    Left hand pain, decreased ROM L hand, decreased /pinch/ADL/IADL  Physician: Eric Ortez MD     Physician Orders: Please begin therapy to the left hand and small finger.  Zone 2 flexor tendon repair protocol.  Please pay attention and protect the digital nerve repair as well.  Please create a thermoplastic custom dorsal blocking splint     Frequency:  3 times per week   Duration:  6 weeks      Diagnosis: Status post left small finger FDS and FDP repairs in zone 2      Medical Diagnosis:   S64.40XA (ICD-10-CM) - Laceration of digital nerve of finger, initial encounter   S56.129A,S61.209A (ICD-10-CM) - Flexor tendon laceration, finger, open wound, initial encounter      Evaluation Date: 11/2/2023  Insurance Authorization Period Expiration: 10/25/2024  Plan of Care Certification Period: 1/31/2024  Date of Return to MD:  Saw on 11/8/2023 sutures removed and will see again on 11/29/2023  Visit # / Visits authorized: 3 / pending  FOTO: Initial/4     Time In: 1130  Time Out: 1158  Total Appointment Time (timed & untimed codes): 28 minutes     Surgical Procedure:   L SF FDS, FDP, and UDN repair      Precautions: Standard and Weightbearing and flexor tendon precautions     Date of Surgery: 10/26/23                             S/P: 2 weeks and 4 days on 11/13/2023      Subjective     Pt reports: No new issues or complaints. Eager to begin some handwashing. Has begun some place and hold with composite flexion with MD clearance.  He was  compliant with home exercise program given last session.   Response to previous treatment:Good  Functional change: none allowed at this time    Pain:  Functional Pain Scale Rating 0-10:   0/10 now  0/10 at best  2/10 at worst  Location: L SF into palm and wrist      Objective     Objective Measures updated at  progress report unless specified.    PROM (pre-tx)  MP NT/45  PIP NT/66  DIP NT/45      Treatment     Alexandr received the treatments listed below:      Supervised Modalities after being cleared for contradictions:   -    Direct Contact Modalities after being cleared for contraindications:   -    Manual Therapy Techniques were applied for 8 minutes, including:  -gentle STM to yoni-incision areas to reduce edema (NT)  -  capsular massage PIPs  to L digits/hand/wrist x 4 min to stimulate lymphatics to decrease pain, edema and increase AROM and functional use.   - Retrograde massage digits 2-5 x 4 min    Therapeutic Exercises to develop  for  ROM, endurance, and/or strength for 20 min  -PROM L RF DIPJ E/F 25 reps  -PROM L RF PIPJ E/F 25 reps  - Gentle, sub max place and hold (with very light holds x 10 composite flexion. (Pt reports he was cleared my MD for some early gentle place and hold given neither of his repaired tendons were completely lacerated  -Conner's PROM (25 reps (within limits of PIP restrictions due to UDN repair)  -PROM composite RF flexion 25 reps  -PROM composite flexion all fingers followed by active extension to DBS 25 reps   -gentle PROM wrist flexion 2x10 from splint position to full flexion  -gentle passive tenodesis action from 20-0 degrees wrist flexion, 25 reps  by OT (NT)    11/13/2023 SW monofilament testing with intact radial digital nerve ar 2.83 and ulnar digital nerve 2.83 intact to level of proximal PIP.      Patient Education and Home Exercises     Education provided:   - continue same and can begin some hand washing out of splint while keeping hand relaxed and wrist bend. Running water only. Pat dry and any wiping in splint only.    Written Home Exercises Provided: Patient instructed to cont prior HEP.  Exercises were reviewed and Alexandr was able to demonstrate them prior to the end of the session.  Alexandr demonstrated good  understanding of the HEP provided. See EMR under Patient  Instructions for exercises provided during therapy sessions.       Assessment     Pt would continue to benefit from skilled OT. Pt presents with excellent PROM with all tips grossly intact to DPC. Cont per current poc.    Alexandr is progressing well towards his goals and there are no updates to goals at this time.   - Progress towards goals: Good; STG 1 and 2 met     Pt prognosis is Good.    Pt will continue to benefit from skilled outpatient occupational therapy to address the deficits listed in the problem list on initial evaluation provide pt/family education and to maximize pt's level of independence in the home and community environment.     Pt's spiritual, cultural and educational needs considered and pt agreeable to plan of care and goals.    Anticipated barriers to occupational therapy: none     Goals:  Short Term Goals: (4 weeks)  1.Pt will be independent with HEP in 2 visits. (Met 11/8/23)  2. Pt will be compliant with splint wear per protocol. (Met 11/8/23)  3. Pt will report decreased pain to a 4-5/10 at worst.  4. Pt will exhibit full PROM of L SF to DPC.   5. Pt will  increase place/hold flexion LING by 10 -20 degrees at L SF to facilitate  future functional grasp once allowed post operatively.   6. Pt will increase LING L SF by 30-40 degrees once AROM initiated.      Long Term Goals: (by discharge)  1. Pt will report decreased pain to 1-2/10 with ADLs.   2. Pt will make a full, flat, composite fist to enable grasping and squeezing objects for self-care.  3. Pt will exhibit L  strength at 75%+ of R to allow a firm grasp on cooking utensils, steering wheel, work tools, etc.  4. Pt will exhibit L functional pinch strength at 75%+ of R comparative measures to allow writing, opening containers, tieing knots, pulling drawstrings, and turning keys.  5. Pt will exhibit a significant increase (50+ points) in the FOTO assessment.  6. Pt will return to PLOF.     Plan     Certification Period/Plan of care  expiration: 11/2/2023 to 1/31/2024.  Continue Occupational Therapy 3 times per week x 6 weeks to decrease pain and edema, and increase A/PROM, strength, and functional use of L upper extremity.     Updates/Grading for next session: progress per protocol.    IDALIA Grimaldo/TON

## 2023-11-13 ENCOUNTER — CLINICAL SUPPORT (OUTPATIENT)
Dept: REHABILITATION | Facility: HOSPITAL | Age: 38
End: 2023-11-13
Payer: COMMERCIAL

## 2023-11-13 DIAGNOSIS — R29.898 DECREASED GRIP STRENGTH OF LEFT HAND: ICD-10-CM

## 2023-11-13 DIAGNOSIS — M25.642 DECREASED RANGE OF MOTION OF FINGER OF LEFT HAND: ICD-10-CM

## 2023-11-13 DIAGNOSIS — M79.642 HAND PAIN, LEFT: ICD-10-CM

## 2023-11-13 DIAGNOSIS — R29.898 DECREASED PINCH STRENGTH: ICD-10-CM

## 2023-11-13 DIAGNOSIS — Z78.9 ALTERATION IN INSTRUMENTAL ACTIVITIES OF DAILY LIVING (IADL): Primary | ICD-10-CM

## 2023-11-13 PROCEDURE — 97140 MANUAL THERAPY 1/> REGIONS: CPT | Mod: PO

## 2023-11-13 PROCEDURE — 97110 THERAPEUTIC EXERCISES: CPT | Mod: PO

## 2023-11-14 NOTE — PROGRESS NOTES
[facility]  Occupational Therapy Treatment Note     Date: 11/15/2023  Name: Elieser Kumar  Clinic Number: [id number]    Therapy Diagnosis:    Left hand pain, decreased ROM L hand, decreased /pinch/ADL/IADL  Physician: Eric Ortez MD     Physician Orders: Please begin therapy to the left hand and small finger.  Zone 2 flexor tendon repair protocol.  Please pay attention and protect the digital nerve repair as well.  Please create a thermoplastic custom dorsal blocking splint     Frequency:  3 times per week   Duration:  6 weeks      Diagnosis: Status post left small finger FDS and FDP repairs in zone 2      Medical Diagnosis:   S64.40XA (ICD-10-CM) - Laceration of digital nerve of finger, initial encounter   S56.129A,S61.209A (ICD-10-CM) - Flexor tendon laceration, finger, open wound, initial encounter      Evaluation Date: 11/2/2023  Insurance Authorization Period Expiration: 10/25/2024  Plan of Care Certification Period: 1/31/2024  Date of Return to MD:  Saw on 11/8/2023 sutures removed and will see again on 11/29/2023  Visit # / Visits authorized: 4 / pending  FOTO: Initial/4     Time In: 0940  Time Out: 1025  Total Appointment Time (timed & untimed codes): 45 minutes     Surgical Procedure:   L SF FDS, FDP, and UDN repair      Precautions: Standard and Weightbearing and flexor tendon precautions     Date of Surgery: 10/26/23                             S/P: 2 weeks and 6 days on 11/15/2023      Subjective     Pt reports: his uninvolved fingers have been cramping with theplace/hold exercise. Index and RF are sore.    He was  compliant with home exercise program given last session.   Response to previous treatment:Good  Functional change: none allowed at this time    Pain:  Functional Pain Scale Rating 0-10:   0/10 now  0/10 at best  2/10 at worst  Location: L SF into palm and wrist      Objective     Not measured this date   Objective Measures updated at progress report  unless specified.    PROM (pre-tx)  MP NT/45  PIP NT/66  DIP NT/45      Treatment     Alexandr received the treatments listed below:      Supervised Modalities after being cleared for contradictions:   -    Direct Contact Modalities after being cleared for contraindications:   -    Manual Therapy Techniques were applied for 7 minutes, including:  -gentle scar mob 3 min   -  capsular massage PIPs  to L digits/hand/wrist x 4 min to stimulate lymphatics to decrease pain, edema and increase AROM and functional use. (NT)   - Retrograde massage digits 2-5 x 4 min    Therapeutic Exercises to develop  for  ROM, endurance, and/or strength for 33 min  -PROM L RF DIPJ E/F 25 reps  -PROM L RF PIPJ E/F 25 reps  - Gentle, sub max place and hold (with very light holds x 10 composite flexion. (Pt reports he was cleared my MD for some early gentle place and hold given neither of his repaired tendons were completely lacerated  -Conner's PROM (25 reps (within limits of PIP restrictions due to UDN repair)  -PROM isolated and composite joints   -PROM composite RF flexion 25 reps  -PROM composite flexion all fingers followed by active extension to DBS 25 reps   -gentle passive wrist flexion 2x10 with gentle active wrist extension to 0 degrees 20x   -gentle passive tenodesis action from 20-0 degrees wrist flexion, 25 reps  by OT (NT)  -place/hold 2 reps composite flexion   .  Orthotic [employer] and Training (38214) x 5 min:   -modified orthosis to reduce SF PIP flexion angle to 20 degrees. Also trimmed sides of orthosis at pt request  to improve ability       11/13/2023 SW monofilament testing with intact radial digital nerve ar 2.83 and ulnar digital nerve 2.83 intact to level of proximal PIP.      Patient Education and Home Exercises     Education provided:   - continue same     Written Home Exercises Provided: Patient instructed to cont prior HEP.  Exercises were reviewed and Alexandr was able to demonstrate them prior to the end of the  session.  Alexandr demonstrated good  understanding of the HEP provided. See EMR under Patient Instructions for exercises provided during therapy sessions.       Assessment     Pt would continue to benefit from skilled OT.  Pt progressing well. Good place/hold ability. Pt reporting some discomfort in uninvolved fingers.     Alexandr is progressing well towards his goals and there are no updates to goals at this time.   - Progress towards goals: Good;     Pt prognosis is Good.    Pt will continue to benefit from skilled outpatient occupational therapy to address the deficits listed in the problem list on initial evaluation provide pt/family education and to maximize pt's level of independence in the home and community environment.     Pt's spiritual, cultural and educational needs considered and pt agreeable to plan of care and goals.    Anticipated barriers to occupational therapy: none     Goals:  Short Term Goals: (4 weeks)  1.Pt will be independent with HEP in 2 visits. (Met 11/8/23)  2. Pt will be compliant with splint wear per protocol. (Met 11/8/23)  3. Pt will report decreased pain to a 4-5/10 at worst.  4. Pt will exhibit full PROM of L SF to DPC.   5. Pt will  increase place/hold flexion LING by 10 -20 degrees at L SF to facilitate  future functional grasp once allowed post operatively.   6. Pt will increase LING L SF by 30-40 degrees once AROM initiated.      Long Term Goals: (by discharge)  1. Pt will report decreased pain to 1-2/10 with ADLs.   2. Pt will make a full, flat, composite fist to enable grasping and squeezing objects for self-care.  3. Pt will exhibit L  strength at 75%+ of R to allow a firm grasp on cooking utensils, steering wheel, work tools, etc.  4. Pt will exhibit L functional pinch strength at 75%+ of R comparative measures to allow writing, opening containers, tieing knots, pulling drawstrings, and turning keys.  5. Pt will exhibit a significant increase (50+ points) in the FOTO  assessment.  6. Pt will return to PLOF.     Plan     Certification Period/Plan of care expiration: 11/2/2023 to 1/31/2024.  Continue Occupational Therapy 3 times per week x 6 weeks to decrease pain and edema, and increase A/PROM, strength, and functional use of L upper extremity.     Updates/Grading for next session: progress per protocol.    IDALIA Salmeron, ANNE MARIET

## 2023-11-15 ENCOUNTER — CLINICAL SUPPORT (OUTPATIENT)
Dept: REHABILITATION | Facility: HOSPITAL | Age: 38
End: 2023-11-15
Payer: COMMERCIAL

## 2023-11-15 DIAGNOSIS — M25.642 DECREASED RANGE OF MOTION OF FINGER OF LEFT HAND: ICD-10-CM

## 2023-11-15 DIAGNOSIS — Z78.9 ALTERATION IN INSTRUMENTAL ACTIVITIES OF DAILY LIVING (IADL): Primary | ICD-10-CM

## 2023-11-15 DIAGNOSIS — M79.642 HAND PAIN, LEFT: ICD-10-CM

## 2023-11-15 DIAGNOSIS — R29.898 DECREASED GRIP STRENGTH OF LEFT HAND: ICD-10-CM

## 2023-11-15 DIAGNOSIS — R29.898 DECREASED PINCH STRENGTH: ICD-10-CM

## 2023-11-15 PROCEDURE — 97110 THERAPEUTIC EXERCISES: CPT | Mod: PO

## 2023-11-15 PROCEDURE — 97140 MANUAL THERAPY 1/> REGIONS: CPT | Mod: PO

## 2023-11-16 NOTE — PROGRESS NOTES
[facility] OUTPATIENT THERAPY AND WELLNESS  Occupational Therapy Treatment Note     Date: 11/20/2023  Name: Elieser Kumar  Clinic Number: [id number]    Therapy Diagnosis:    Left hand pain, decreased ROM L hand, decreased /pinch/ADL/IADL  Physician: Eric Ortez MD     Physician Orders: Please begin therapy to the left hand and small finger.  Zone 2 flexor tendon repair protocol.  Please pay attention and protect the digital nerve repair as well.  Please create a thermoplastic custom dorsal blocking splint     Frequency:  3 times per week   Duration:  6 weeks      Diagnosis: Status post left small finger FDS and FDP repairs in zone 2      Medical Diagnosis:   S64.40XA (ICD-10-CM) - Laceration of digital nerve of finger, initial encounter   S56.129A,S61.209A (ICD-10-CM) - Flexor tendon laceration, finger, open wound, initial encounter      Evaluation Date: 11/2/2023  Insurance Authorization Period Expiration: 10/25/2024  Plan of Care Certification Period: 1/31/2024  Date of Return to MD:  Saw on 11/8/2023 sutures removed and will see again on 11/29/2023  Visit # / Visits authorized: 6 / pending  FOTO: Initial/4     Time In: 1130  Time Out: 1203  Total Appointment Time (timed & untimed codes): 33 minutes     Surgical Procedure:   L SF FDS, FDP, and UDN repair      Precautions: Standard and Weightbearing and flexor tendon precautions     Date of Surgery: 10/26/23                             S/P: 3 weeks and 4 days on 11/20/2023      Subjective     Pt reports: he is going out of town so canceled Wednesday's visit. No complaints about orthotic adjustments made last week. Has been using a cold pack and was able to get all scabs off and removed last remaining suture.    He was  compliant with home exercise program given last session.   Response to previous treatment:Good  Functional change: none allowed at this time    Pain:  Functional Pain Scale Rating 0-10:   0/10 now  0/10 at best  2/10 at worst  Location:  L SF into palm and wrist      Objective     PROM measured this date   Objective Measures updated at progress report unless specified.    PROM (post-tx)  MP NT/85 (+40)  PIP NT/95 (+29)  DIP NT/75 (+30)      Place/hold DIP flexion 40 degrees    Treatment     Alexandr received the treatments listed below:      Supervised Modalities after being cleared for contradictions:   -    Direct Contact Modalities after being cleared for contraindications:   -    Manual Therapy Techniques were applied for 7 minutes, including:  -gentle scar mob 3 min   -  capsular massage PIPs  to L digits/hand/wrist x 4 min to stimulate lymphatics to decrease pain, edema and increase AROM and functional use. (NT)   -Retrograde massage to L digits/hand/wrist x 3 min to stimulate lymphatics to decrease pain,  edema and increase AROM and functional use.       Therapeutic Exercises to develop  for  ROM, endurance, and/or strength for 26 min  -PROM L SF DIPJ E/F 25 reps (NT)   -PROM L SF PIPJ E/F 25 reps  - Gentle, sub max place and hold (with very light holds x 10 composite flexion. (Pt reports he was cleared my MD for some early gentle place and hold given neither of his repaired tendons were completely lacerated  -Conner's PROM (25 reps (within limits of PIP restrictions due to UDN repair)  -PROM isolated and composite joints   -PROM composite SF flexion 25 reps  -PROM composite flexion all fingers followed by active extension to DBS 25 reps   -gentle passive wrist flexion 2x10 with gentle active wrist extension to 0 degrees 20x   -place/hold 2 reps composite flexion       11/13/2023 SW monofilament testing with intact radial digital nerve ar 2.83 and ulnar digital nerve 2.83 intact to level of proximal PIP.      Patient Education and Home Exercises     Education provided:   - continue same     Written Home Exercises Provided: Patient instructed to cont prior HEP.  Exercises were reviewed and Alexandr was able to demonstrate them prior to the end of the  session.  Alexandr demonstrated good  understanding of the HEP provided. See EMR under Patient Instructions for exercises provided during therapy sessions.       Assessment     Pt would continue to benefit from skilled OT.  Moderate thickening at incision/lac site. Excellent improvement in PROM of SF this date with tip touching palm. Pt to perform place/hold 25 reps qoh beginning on 11/23/23 rather than a few reps. (MD instructed pt to initiate place/hold before standard protocol guidelines.)    Alexandr is progressing well towards his goals and there are no updates to goals at this time.   - Progress towards goals: Good; STG 4 met    Pt prognosis is Good.    Pt will continue to benefit from skilled outpatient occupational therapy to address the deficits listed in the problem list on initial evaluation provide pt/family education and to maximize pt's level of independence in the home and community environment.     Pt's spiritual, cultural and educational needs considered and pt agreeable to plan of care and goals.    Anticipated barriers to occupational therapy: none     Goals:  Short Term Goals: (4 weeks)  1.Pt will be independent with HEP in 2 visits. (Met 11/8/23)  2. Pt will be compliant with splint wear per protocol. (Met 11/8/23)  3. Pt will report decreased pain to a 4-5/10 at worst.  4. Pt will exhibit full PROM of L SF to DPC.  (Met 11/20/23)  5. Pt will  increase place/hold flexion LING by 10 -20 degrees at L SF to facilitate  future functional grasp once allowed post operatively.   6. Pt will increase LING L SF by 30-40 degrees once AROM initiated.      Long Term Goals: (by discharge)  1. Pt will report decreased pain to 1-2/10 with ADLs.   2. Pt will make a full, flat, composite fist to enable grasping and squeezing objects for self-care.  3. Pt will exhibit L  strength at 75%+ of R to allow a firm grasp on cooking utensils, steering wheel, work tools, etc.  4. Pt will exhibit L functional pinch strength at  75%+ of R comparative measures to allow writing, opening containers, tieing knots, pulling drawstrings, and turning keys.  5. Pt will exhibit a significant increase (50+ points) in the FOTO assessment.  6. Pt will return to PLOF.     Plan     Certification Period/Plan of care expiration: 11/2/2023 to 1/31/2024.  Continue Occupational Therapy 3 times per week x 6 weeks to decrease pain and edema, and increase A/PROM, strength, and functional use of L upper extremity.     Updates/Grading for next session: progress per protocol.    IDALIA Salmeron, CHT

## 2023-11-20 ENCOUNTER — CLINICAL SUPPORT (OUTPATIENT)
Dept: REHABILITATION | Facility: HOSPITAL | Age: 38
End: 2023-11-20
Payer: COMMERCIAL

## 2023-11-20 DIAGNOSIS — M25.642 DECREASED RANGE OF MOTION OF FINGER OF LEFT HAND: ICD-10-CM

## 2023-11-20 DIAGNOSIS — Z78.9 ALTERATION IN INSTRUMENTAL ACTIVITIES OF DAILY LIVING (IADL): Primary | ICD-10-CM

## 2023-11-20 DIAGNOSIS — R29.898 DECREASED PINCH STRENGTH: ICD-10-CM

## 2023-11-20 DIAGNOSIS — M79.642 HAND PAIN, LEFT: ICD-10-CM

## 2023-11-20 DIAGNOSIS — R29.898 DECREASED GRIP STRENGTH OF LEFT HAND: ICD-10-CM

## 2023-11-20 PROCEDURE — 97110 THERAPEUTIC EXERCISES: CPT | Mod: PO

## 2023-11-22 ENCOUNTER — PATIENT MESSAGE (OUTPATIENT)
Dept: REHABILITATION | Facility: HOSPITAL | Age: 38
End: 2023-11-22
Payer: COMMERCIAL

## 2023-11-29 ENCOUNTER — OFFICE VISIT (OUTPATIENT)
Dept: ORTHOPEDICS | Facility: CLINIC | Age: 38
End: 2023-11-29
Payer: COMMERCIAL

## 2023-11-29 ENCOUNTER — CLINICAL SUPPORT (OUTPATIENT)
Dept: REHABILITATION | Facility: HOSPITAL | Age: 38
End: 2023-11-29
Payer: COMMERCIAL

## 2023-11-29 VITALS — HEIGHT: 73 IN | WEIGHT: 205 LBS | BODY MASS INDEX: 27.17 KG/M2

## 2023-11-29 DIAGNOSIS — R29.898 DECREASED GRIP STRENGTH OF LEFT HAND: ICD-10-CM

## 2023-11-29 DIAGNOSIS — M79.642 HAND PAIN, LEFT: ICD-10-CM

## 2023-11-29 DIAGNOSIS — M25.642 DECREASED RANGE OF MOTION OF FINGER OF LEFT HAND: ICD-10-CM

## 2023-11-29 DIAGNOSIS — Z78.9 ALTERATION IN INSTRUMENTAL ACTIVITIES OF DAILY LIVING (IADL): Primary | ICD-10-CM

## 2023-11-29 DIAGNOSIS — S64.40XD LACERATION OF DIGITAL NERVE OF FINGER, SUBSEQUENT ENCOUNTER: Primary | ICD-10-CM

## 2023-11-29 DIAGNOSIS — R29.898 DECREASED PINCH STRENGTH: ICD-10-CM

## 2023-11-29 DIAGNOSIS — S61.209A FLEXOR TENDON LACERATION, FINGER, OPEN WOUND, INITIAL ENCOUNTER: ICD-10-CM

## 2023-11-29 DIAGNOSIS — S56.129A FLEXOR TENDON LACERATION, FINGER, OPEN WOUND, INITIAL ENCOUNTER: ICD-10-CM

## 2023-11-29 PROCEDURE — 99024 PR POST-OP FOLLOW-UP VISIT: ICD-10-PCS | Mod: S$GLB,,, | Performed by: ORTHOPAEDIC SURGERY

## 2023-11-29 PROCEDURE — 99999 PR PBB SHADOW E&M-EST. PATIENT-LVL II: CPT | Mod: PBBFAC,,, | Performed by: ORTHOPAEDIC SURGERY

## 2023-11-29 PROCEDURE — 99999 PR PBB SHADOW E&M-EST. PATIENT-LVL II: ICD-10-PCS | Mod: PBBFAC,,, | Performed by: ORTHOPAEDIC SURGERY

## 2023-11-29 PROCEDURE — 97110 THERAPEUTIC EXERCISES: CPT | Mod: PO

## 2023-11-29 PROCEDURE — 97140 MANUAL THERAPY 1/> REGIONS: CPT | Mod: PO

## 2023-11-29 PROCEDURE — 99024 POSTOP FOLLOW-UP VISIT: CPT | Mod: S$GLB,,, | Performed by: ORTHOPAEDIC SURGERY

## 2023-11-29 NOTE — PROGRESS NOTES
[facility]  Occupational Therapy Treatment Note     Date: 11/29/2023  Name: Elieser Kumar  Clinic Number: [id number]    Therapy Diagnosis:    Left hand pain, decreased ROM L hand, decreased /pinch/ADL/IADL  Physician: Eric Ortez MD     Physician Orders: Please begin therapy to the left hand and small finger.  Zone 2 flexor tendon repair protocol.  Please pay attention and protect the digital nerve repair as well.  Please create a thermoplastic custom dorsal blocking splint     Frequency:  3 times per week   Duration:  6 weeks      Diagnosis: Status post left small finger FDS and FDP repairs in zone 2      Medical Diagnosis:   S64.40XA (ICD-10-CM) - Laceration of digital nerve of finger, initial encounter   S56.129A,S61.209A (ICD-10-CM) - Flexor tendon laceration, finger, open wound, initial encounter      Evaluation Date: 11/2/2023  Insurance Authorization Period Expiration: 10/25/2024  Plan of Care Certification Period: 1/31/2024  Date of Return to MD:  Saw on 11/8/2023 sutures removed and will see again on 11/29/2023  Visit # / Visits authorized: 6 / pending  FOTO: Initial/4     Time In: 0950  Time Out: 1022  Total Appointment Time (timed & untimed codes): 32 minutes     Surgical Procedure:   L SF FDS, FDP, and UDN repair      Precautions: Standard and Weightbearing and flexor tendon precautions     Date of Surgery: 10/26/23                             S/P: 4 weeks and 6 days on 11/29/2023      Subjective     Pt reports: He has been busy at work, but performs extra work on his home program when he is way from work.   Eager to begin the active phase of therapy.      He was  compliant with home exercise program given last session.   Response to previous treatment:Good  Functional change: none allowed at this time    Pain:  Functional Pain Scale Rating 0-10:   0/10 now  0/10 at best  2/10 at worst  Location: L SF into palm and wrist      Objective     PROM measured this  date   Objective Measures updated at progress report unless specified.    PROM (post-tx)  MP NT/85 (+40)  PIP NT/95 (+29)  DIP NT/75 (+30)    Place/hold DIP flexion 40 degrees (NT)  Place and hold tip to DPC R small finger = -2.0 cm on 11/29/2023    Treatment     Alexandr received the treatments listed below:      Supervised Modalities after being cleared for contradictions:   -    Direct Contact Modalities after being cleared for contraindications:   -    Manual Therapy Techniques were applied for 9 minutes, including:  - Gentle scar massage and mob 5 min   - Capsular massage PIPs  to L digits/hand/wrist x 2 min to stimulate lymphatics to decrease pain, edema and increase AROM and functional use.    - Retrograde massage to L digits/hand/wrist x 2 min to stimulate lymphatics to decrease pain,  edema and increase AROM and functional use.     Therapeutic Exercises to develop  for  ROM, endurance, and/or strength for 23 min  -PROM L SF DIPJ E/F 25 reps (NT)   -PROM L SF PIPJ E/F 25 reps  - Gentle, sub max place and hold (with very light holds x 10 composite flexion. (Pt reports he was cleared my MD for some early gentle place and hold given neither of his repaired tendons were completely lacerated  -Prieto's PROM (25 reps (within limits of PIP restrictions due to UDN repair)  -PROM isolated and composite joints   -PROM composite SF flexion 25 reps  -PROM composite flexion all fingers followed by active extension to DBS 25 reps   -gentle passive wrist flexion 2x10 with gentle active wrist extension to 0 degrees 20x   -place/hold 2 reps composite flexion   - Instructed on tendon glides in splint to begin next date (handout provided)      11/13/2023 SW monofilament testing with intact radial digital nerve ar 2.83 and ulnar digital nerve 2.83 intact to level of proximal PIP.      Patient Education and Home Exercises     Education provided:   - continue same. Tendon glides in splint to begin next date (handout provided)      Written Home Exercises Provided: Patient instructed to cont prior HEP. Tendon glides in splint to begin next date (handout provided)  Exercises were reviewed and Alexandr was able to demonstrate them prior to the end of the session.  Alexandr demonstrated good  understanding of the HEP provided. See EMR under Patient Instructions for exercises provided during therapy sessions.       Assessment     Pt would continue to benefit from skilled OT.  Moderate thickening at incision/lac site. Excellent PRM continues and pt advancing with place and hold. AROM in splint to commence next date.    Alexandr is progressing well towards his goals and there are no updates to goals at this time.   - Progress towards goals: Good; STG 4 met    Pt prognosis is Good.    Pt will continue to benefit from skilled outpatient occupational therapy to address the deficits listed in the problem list on initial evaluation provide pt/family education and to maximize pt's level of independence in the home and community environment.     Pt's spiritual, cultural and educational needs considered and pt agreeable to plan of care and goals.    Anticipated barriers to occupational therapy: none     Goals:  Short Term Goals: (4 weeks)  1.Pt will be independent with HEP in 2 visits. (Met 11/8/23)  2. Pt will be compliant with splint wear per protocol. (Met 11/8/23)  3. Pt will report decreased pain to a 4-5/10 at worst.  4. Pt will exhibit full PROM of L SF to DPC.  (Met 11/20/23)  5. Pt will  increase place/hold flexion LING by 10 -20 degrees at L SF to facilitate  future functional grasp once allowed post operatively.   6. Pt will increase LING L SF by 30-40 degrees once AROM initiated.      Long Term Goals: (by discharge)  1. Pt will report decreased pain to 1-2/10 with ADLs.   2. Pt will make a full, flat, composite fist to enable grasping and squeezing objects for self-care.  3. Pt will exhibit L  strength at 75%+ of R to allow a firm grasp on cooking  utensils, steering wheel, work tools, etc.  4. Pt will exhibit L functional pinch strength at 75%+ of R comparative measures to allow writing, opening containers, tieing knots, pulling drawstrings, and turning keys.  5. Pt will exhibit a significant increase (50+ points) in the FOTO assessment.  6. Pt will return to PLOF.     Plan     Certification Period/Plan of care expiration: 11/2/2023 to 1/31/2024.  Continue Occupational Therapy 3 times per week x 6 weeks to decrease pain and edema, and increase A/PROM, strength, and functional use of L upper extremity.     Updates/Grading for next session: progress per protocol.    IDALIA Grimaldo/TON

## 2023-11-29 NOTE — PROGRESS NOTES
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  He was status post left small finger flexor tendon repair and digital nerve repair.  He is overall improving.  He continues to work therapy     Physical exam:  Examination left hand reveals that the incisions in the laceration are now well healed.  There is a small amount of scar tissue at its base.  He was able to place and hold within 1 cm of the distal palmar crease.  He can extend to full extension.  He does have sensation intact on the radial side of the digit.  Ulnar sensation is still significantly decreased     Assessment: Status post left small finger flexor tendon repair in zone 2 and ulnar digital nerve repair     Plan:    1. He will continue to work aggressively with therapy    2. Will begin to wean him out of the thermoplastic splint after next week     3. Will follow up in 4 weeks for repeat evaluation

## 2023-12-01 NOTE — PROGRESS NOTES
[facility]  Occupational Therapy Treatment Note     Date: 12/4/2023  Name: Elieser Kumar  Clinic Number: [id number]    Therapy Diagnosis:    Left hand pain, decreased ROM L hand, decreased /pinch/ADL/IADL  Physician: Eric Ortez MD     Physician Orders: Please begin therapy to the left hand and small finger.  Zone 2 flexor tendon repair protocol.  Please pay attention and protect the digital nerve repair as well.  Please create a thermoplastic custom dorsal blocking splint     Frequency:  3 times per week   Duration:  6 weeks      Diagnosis: Status post left small finger FDS and FDP repairs in zone 2      Medical Diagnosis:   S64.40XA (ICD-10-CM) - Laceration of digital nerve of finger, initial encounter   S56.129A,S61.209A (ICD-10-CM) - Flexor tendon laceration, finger, open wound, initial encounter      Evaluation Date: 11/2/2023  Insurance Authorization Period Expiration: 10/25/2024  Plan of Care Certification Period: 1/31/2024  Date of Return to MD:  Saw on 11/8/2023 sutures removed and will see again on 11/29/2023  Visit # / Visits authorized:  7 / pending  FOTO: Initial/4     Time In:  0915  Time Out:  0948  Total Appointment Time (timed & untimed codes):  33 minutes     Surgical Procedure:   L SF FDS, FDP, and UDN repair      Precautions: Standard and Weightbearing and flexor tendon precautions     Date of Surgery: 10/26/23                             S/P: 5 weeks and 4 days on 12/4/2023      Subjective     Pt reports: Had some pain with initial AROM in splint, but better now only with  the first few reps.      He was  compliant with home exercise program given last session.   Response to previous treatment:Good  Functional change: none allowed at this time    Pain:  Functional Pain Scale Rating 0-10:   0/10 now  0/10 at best  0/10 at worst 9had some 6/10 pain with first few days AROM in splint.  Location: L SF into palm and wrist      Objective     PROM measured  this date   Objective Measures updated at progress report unless specified.    PROM (post-tx)  MP NT/85 (+40)  PIP NT/95 (+29)  DIP NT/75 (+30)    Place/hold DIP flexion 40 degrees (NT)    Place and hold tip to DPC R small finger = -1.5 pre and -1.0 post on 12/04/2023    AROM tip to DPC R small finger = -1.8 pre and -1.0 post on 12/4/2023    Treatment     Alexandr received the treatments listed below:      Supervised Modalities after being cleared for contradictions:   -    Direct Contact Modalities after being cleared for contraindications:   -    Manual Therapy Techniques were applied for 10 minutes, including:  - Gentle scar massage and mob 5 min   - Capsular massage PIPs  to L digits/hand/wrist x 3 min to stimulate lymphatics to decrease pain, edema and increase AROM and functional use.    - Retrograde massage to L digits/hand/wrist x 2 min to stimulate lymphatics to decrease pain,  edema and increase AROM and functional use.     Therapeutic Exercises to develop  for  ROM, endurance, and/or strength for 23 min  -PROM L SF DIPJ E/F 25 reps (NT)   -PROM L SF PIPJ E/F 25 reps  - Gentle, sub max place and hold (with very light holds x 10 composite flexion. (Pt reports he was cleared my MD for some early gentle place and hold given neither of his repaired tendons were completely lacerated  -Connre's PROM (25 reps (within limits of PIP restrictions due to UDN repair)  -PROM isolated and composite joints   -PROM composite SF flexion 25 reps  -PROM composite flexion all fingers followed by active extension to DBS 25 reps   -gentle passive wrist flexion 2x10 with gentle active wrist extension to 0 degrees 20x   -place/hold 2 reps composite flexion   -Instructed on AROM tendon glides and Wrist AROM out of splint to begin Thurs 12/7/2023 11/13/2023 SW monofilament testing with intact radial digital nerve ar 2.83 and ulnar digital nerve 2.83 intact to level of proximal PIP.      Patient Education and Home Exercises      Education provided:   - continue same. Tendon glides and Wrist AROM out of sploint to begin 12/7/2023 (handout provided)     Written Home Exercises Provided: Patient instructed to cont prior HEP. Tendon glides in splint to begin next date (handout provided)  Exercises were reviewed and Alexandr was able to demonstrate them prior to the end of the session.  Alexandr demonstrated good  understanding of the HEP provided. See EMR under Patient Instructions for exercises provided during therapy sessions.       Assessment     Pt would continue to benefit from skilled OT.  Excellent gains with place and hold and AROM in splint equal to place and hold post tx today. Pt with good understanding with next stage of protocol to commence on 12/7/2023 (AROM out of splint and begin weaning out of splint with 1# wt limit and no PROM ext of wrist or digits). Cont per current poc/protocol.    Alexandr is progressing well towards his goals and there are no updates to goals at this time.   - Progress towards goals: Good; STG 4 met    Pt prognosis is Good.    Pt will continue to benefit from skilled outpatient occupational therapy to address the deficits listed in the problem list on initial evaluation provide pt/family education and to maximize pt's level of independence in the home and community environment.     Pt's spiritual, cultural and educational needs considered and pt agreeable to plan of care and goals.    Anticipated barriers to occupational therapy: none     Goals:  Short Term Goals: (4 weeks)  1.Pt will be independent with HEP in 2 visits. (Met 11/8/23)  2. Pt will be compliant with splint wear per protocol. (Met 11/8/23)  3. Pt will report decreased pain to a 4-5/10 at worst.  4. Pt will exhibit full PROM of L SF to DPC.  (Met 11/20/23)  5. Pt will  increase place/hold flexion LING by 10 -20 degrees at L SF to facilitate  future functional grasp once allowed post operatively.   6. Pt will increase LING L SF by 30-40 degrees once  AROM initiated.      Long Term Goals: (by discharge)  1. Pt will report decreased pain to 1-2/10 with ADLs.   2. Pt will make a full, flat, composite fist to enable grasping and squeezing objects for self-care.  3. Pt will exhibit L  strength at 75%+ of R to allow a firm grasp on cooking utensils, steering wheel, work tools, etc.  4. Pt will exhibit L functional pinch strength at 75%+ of R comparative measures to allow writing, opening containers, tieing knots, pulling drawstrings, and turning keys.  5. Pt will exhibit a significant increase (50+ points) in the FOTO assessment.  6. Pt will return to PLOF.     Plan     Certification Period/Plan of care expiration: 11/2/2023 to 1/31/2024.  Continue Occupational Therapy 3 times per week x 6 weeks to decrease pain and edema, and increase A/PROM, strength, and functional use of L upper extremity.     Updates/Grading for next session: progress per protocol.    IDALIA Grimaldo/TON

## 2023-12-04 ENCOUNTER — CLINICAL SUPPORT (OUTPATIENT)
Dept: REHABILITATION | Facility: HOSPITAL | Age: 38
End: 2023-12-04
Payer: COMMERCIAL

## 2023-12-04 DIAGNOSIS — R29.898 DECREASED GRIP STRENGTH OF LEFT HAND: ICD-10-CM

## 2023-12-04 DIAGNOSIS — M25.642 DECREASED RANGE OF MOTION OF FINGER OF LEFT HAND: ICD-10-CM

## 2023-12-04 DIAGNOSIS — R29.898 DECREASED PINCH STRENGTH: ICD-10-CM

## 2023-12-04 DIAGNOSIS — Z78.9 ALTERATION IN INSTRUMENTAL ACTIVITIES OF DAILY LIVING (IADL): Primary | ICD-10-CM

## 2023-12-04 DIAGNOSIS — M79.642 HAND PAIN, LEFT: ICD-10-CM

## 2023-12-04 PROCEDURE — 97140 MANUAL THERAPY 1/> REGIONS: CPT | Mod: PO

## 2023-12-04 PROCEDURE — 97110 THERAPEUTIC EXERCISES: CPT | Mod: PO

## 2023-12-12 ENCOUNTER — CLINICAL SUPPORT (OUTPATIENT)
Dept: REHABILITATION | Facility: HOSPITAL | Age: 38
End: 2023-12-12
Payer: COMMERCIAL

## 2023-12-12 ENCOUNTER — PATIENT MESSAGE (OUTPATIENT)
Dept: REHABILITATION | Facility: HOSPITAL | Age: 38
End: 2023-12-12

## 2023-12-12 ENCOUNTER — TELEPHONE (OUTPATIENT)
Dept: ORTHOPEDICS | Facility: CLINIC | Age: 38
End: 2023-12-12
Payer: COMMERCIAL

## 2023-12-12 DIAGNOSIS — R29.898 DECREASED GRIP STRENGTH OF LEFT HAND: ICD-10-CM

## 2023-12-12 DIAGNOSIS — Z78.9 ALTERATION IN INSTRUMENTAL ACTIVITIES OF DAILY LIVING (IADL): Primary | ICD-10-CM

## 2023-12-12 DIAGNOSIS — R29.898 DECREASED PINCH STRENGTH: ICD-10-CM

## 2023-12-12 DIAGNOSIS — M25.642 DECREASED RANGE OF MOTION OF FINGER OF LEFT HAND: ICD-10-CM

## 2023-12-12 DIAGNOSIS — M79.642 HAND PAIN, LEFT: ICD-10-CM

## 2023-12-12 PROCEDURE — 97140 MANUAL THERAPY 1/> REGIONS: CPT | Mod: PO

## 2023-12-12 PROCEDURE — 97110 THERAPEUTIC EXERCISES: CPT | Mod: PO

## 2023-12-12 PROCEDURE — 97022 WHIRLPOOL THERAPY: CPT | Mod: PO

## 2023-12-12 NOTE — PROGRESS NOTES
[facility]  Occupational Therapy Treatment Note     Date: 12/12/2023  Name: Elieser Kumar  Clinic Number: [id number]    Therapy Diagnosis:    Left hand pain, decreased ROM L hand, decreased /pinch/ADL/IADL  Physician: Eric Ortez MD     Physician Orders: Please begin therapy to the left hand and small finger.  Zone 2 flexor tendon repair protocol.  Please pay attention and protect the digital nerve repair as well.  Please create a thermoplastic custom dorsal blocking splint     Frequency:  3 times per week   Duration:  6 weeks      Diagnosis: Status post left small finger FDS and FDP repairs in zone 2      Medical Diagnosis:   S64.40XA (ICD-10-CM) - Laceration of digital nerve of finger, initial encounter   S56.129A,S61.209A (ICD-10-CM) - Flexor tendon laceration, finger, open wound, initial encounter      Evaluation Date: 11/2/2023  Insurance Authorization Period Expiration: 10/25/2024  Plan of Care Certification Period: 1/31/2024  Date of Return to MD:  Saw on 11/8/2023 sutures removed and will see again on 11/29/2023  Visit # / Visits authorized:  8 / pending  FOTO: Initial/4     Time In:  0945  Time Out:  1045  Total Appointment Time (timed & untimed codes):  33 minutes     Surgical Procedure:   L SF FDS, FDP, and UDN repair      Precautions: Standard and Weightbearing and flexor tendon precautions     Date of Surgery: 10/26/23                             S/P: 6 weeks and 5 days on 12/12/2023      Subjective     Pt reports: a lot of the pain from last session with AROM resolved. Mild c/o pain during today's session. m      He was  compliant with home exercise program given last session.   Response to previous treatment:Good  Functional change: none allowed at this time    Pain:  Functional Pain Scale Rating 0-10:   0/10 now  0/10 at best  0/10 at worst 9had some 6/10 pain with first few days AROM in splint.  Location: L SF into palm and wrist      Objective      Baseline wrist and finger AROM measured this date   Objective Measures updated at progress report unless specified.  Range of Motion:            Left/ Right   Wrist E/F 60/65    Wrist RD/UD NT    Ring MP   Ring PIP  Ring DIP  0/80 0/100  0/95 0/104  0/28 0/80   Small MP  0/85 0/95    Small PIP  23/70 0/95   Small DIP   LING SF 9/44 0/79  153  264         Place/hold DIP flexion 40 degrees (NT)    Place and hold tip to DPC R small finger = -1.5 pre and -1.0 post on 12/04/2023    AROM tip to DPC R small finger = -1.8 pre and -1.0 post on 12/4/2023    Treatment     Alexandr received the treatments listed below:      Supervised Modalities after being cleared for contradictions:   -Fluidotherapy x 15 min to L hand/arm  to decrease pain, desensitize, and increase tissue extensibility.       Direct Contact Modalities after being cleared for contraindications:   -    Manual Therapy Techniques were applied for 8 minutes, including:  - Gentle scar massage and mob 5 min   - Capsular massage PIPs  to L digits/hand/wrist x 3 min to stimulate lymphatics to decrease pain, edema and increase AROM and functional use.    - Retrograde massage to L digits/hand/wrist x 2 min to stimulate lymphatics to decrease pain,  edema and increase AROM and functional use. (NT)     Therapeutic Exercises to develop  for  ROM, endurance, and/or strength for 32 min  -PROM L SF DIPJ E/F 25 reps   -PROM L SF PIPJ E/F 25 reps  -PROM isolated and composite joints RF  -PROM composite SF flexion 25 reps  -place/hold 5 reps composite flexion   -AROM tendon glides 10x ea  -tenodesis 20x  -finger lifts 10x ea   -opposition to SF  -PIP / with MP flexed 20x     Therapeutic Activity: x 5 min   -gross grasp, molding finger into different functional grasps: 3 dowel sizes, clothespin, sponge, large and medium pom poms, small empty plastic container      11/13/2023 SW monofilament testing with intact radial digital nerve ar 2.83 and ulnar digital nerve 2.83 intact to  level of proximal PIP.      Patient Education and Home Exercises     Education provided:   - continue same.      Written Home Exercises Provided: Patient instructed to cont prior HEP. Tendon glides in splint to begin next date (handout provided)  Exercises were reviewed and Alexandr was able to demonstrate them prior to the end of the session.  Alexandr demonstrated good  understanding of the HEP provided. See EMR under Patient Instructions for exercises provided during therapy sessions.       Assessment     Pt would continue to benefit from skilled OT.  Baseline wrist and finger AROM measures taken this date. Mild flexion contracture developing at PIPJ. LING R  vs L 264.    Alexandr is progressing well towards his goals and there are no updates to goals at this time.   - Progress towards goals: Good; STG 4 met    Pt prognosis is Good.    Pt will continue to benefit from skilled outpatient occupational therapy to address the deficits listed in the problem list on initial evaluation provide pt/family education and to maximize pt's level of independence in the home and community environment.     Pt's spiritual, cultural and educational needs considered and pt agreeable to plan of care and goals.    Anticipated barriers to occupational therapy: none     Goals:  Short Term Goals: (4 weeks)  1.Pt will be independent with HEP in 2 visits. (Met 11/8/23)  2. Pt will be compliant with splint wear per protocol. (Met 11/8/23)  3. Pt will report decreased pain to a 4-5/10 at worst.  4. Pt will exhibit full PROM of L SF to DPC.  (Met 11/20/23)  5. Pt will  increase place/hold flexion LING by 10 -20 degrees at L SF to facilitate  future functional grasp once allowed post operatively.   6. Pt will increase LING L SF by 30-40 degrees once AROM initiated.      Long Term Goals: (by discharge)  1. Pt will report decreased pain to 1-2/10 with ADLs.   2. Pt will make a full, flat, composite fist to enable grasping and squeezing objects for  self-care.  3. Pt will exhibit L  strength at 75%+ of R to allow a firm grasp on cooking utensils, steering wheel, work tools, etc.  4. Pt will exhibit L functional pinch strength at 75%+ of R comparative measures to allow writing, opening containers, tieing knots, pulling drawstrings, and turning keys.  5. Pt will exhibit a significant increase (50+ points) in the FOTO assessment.  6. Pt will return to PLOF.     Plan     Certification Period/Plan of care expiration: 11/2/2023 to 1/31/2024.  Continue Occupational Therapy 3 times per week x 6 weeks to decrease pain and edema, and increase A/PROM, strength, and functional use of L upper extremity.     Updates/Grading for next session: progress per protocol.    IDALIA Salmeron, ANNE MARIET

## 2023-12-12 NOTE — TELEPHONE ENCOUNTER
Kemal Cummins PA-C returned call to [facility] with [employer] for PA for more PT visits. Could never get through the automated line, unable to speak to anyone.

## 2023-12-18 ENCOUNTER — CLINICAL SUPPORT (OUTPATIENT)
Dept: REHABILITATION | Facility: HOSPITAL | Age: 38
End: 2023-12-18
Payer: COMMERCIAL

## 2023-12-18 DIAGNOSIS — M25.642 DECREASED RANGE OF MOTION OF FINGER OF LEFT HAND: ICD-10-CM

## 2023-12-18 DIAGNOSIS — R29.898 DECREASED GRIP STRENGTH OF LEFT HAND: ICD-10-CM

## 2023-12-18 DIAGNOSIS — Z78.9 ALTERATION IN INSTRUMENTAL ACTIVITIES OF DAILY LIVING (IADL): Primary | ICD-10-CM

## 2023-12-18 DIAGNOSIS — M79.642 HAND PAIN, LEFT: ICD-10-CM

## 2023-12-18 DIAGNOSIS — R29.898 DECREASED PINCH STRENGTH: ICD-10-CM

## 2023-12-18 PROCEDURE — 97530 THERAPEUTIC ACTIVITIES: CPT | Mod: PO

## 2023-12-18 PROCEDURE — 97022 WHIRLPOOL THERAPY: CPT | Mod: PO

## 2023-12-18 PROCEDURE — 97110 THERAPEUTIC EXERCISES: CPT | Mod: PO

## 2023-12-18 NOTE — PROGRESS NOTES
" [facility]  Occupational Therapy Treatment Note / Progress Note     Date: 12/18/2023  Name: Elieser Kumar  Clinic Number: [id number]    Therapy Diagnosis:    Left hand pain, decreased ROM L hand, decreased /pinch/ADL/IADL  Physician: Eric Ortez MD     Physician Orders: Please begin therapy to the left hand and small finger.  Zone 2 flexor tendon repair protocol.  Please pay attention and protect the digital nerve repair as well.  Please create a thermoplastic custom dorsal blocking splint     Frequency:  3 times per week   Duration:  6 weeks      Diagnosis: Status post left small finger FDS and FDP repairs in zone 2      Medical Diagnosis:   S64.40XA (ICD-10-CM) - Laceration of digital nerve of finger, initial encounter   S56.129A,S61.209A (ICD-10-CM) - Flexor tendon laceration, finger, open wound, initial encounter      Evaluation Date: 11/2/2023  Insurance Authorization Period Expiration: 12/31/23  Plan of Care Certification Period: 1/31/2024  Date of Return to MD:    Visit # / Visits authorized:  10 / 18  FOTO: Second/46     Time In:  0858  Time Out:  0958  Total Appointment Time (timed & untimed codes):  60 minutes     Surgical Procedure:   L SF FDS, FDP, and UDN repair      Precautions: Standard and Weightbearing and flexor tendon precautions     Date of Surgery: 10/26/23                             S/P: 7 weeks  on 12/14/2023      Subjective     Pt reports: he does feel the brace is keeping him stiff. He's still wearing it at work to prevent his hand from being jostled.       He was  compliant with home exercise program given last session.   Response to previous treatment:Good  Functional change: light ADLs-bathing, dressing,eating,     Pain:  Functional Pain Scale Rating 0-10:   0/10 now  0/10 at best  2/10 at worst , "aching"   Location: L SF into palm and wrist      Objective       Pt has had 10 OT visits. Treatment has consisted of  fluidotherapy, scar " mobilization, edema control, A/PROM, passive stretch, there ex, custom orthotic fabrication and incision care.     small finger AROM measured this date (after fluido)  Objective Measures updated at progress report unless specified.  Range of Motion:            Left/ Right   Wrist E/F 60/65    Wrist RD/UD NT    Ring MP   Ring PIP  Ring DIP  0/80 0/100  0/95 0/104  0/28 0/80   Small MP  0/90 (+5)  0/95    Small PIP  24/81 (-1/+11) 0/95   Small DIP   LING SF 10/35 (-1/-9) 0/79  172 (+19)  264     Radial measurement SF DIP active flexion during session 60 degrees     Place/hold DIP flexion 40 degrees (NT)    Place and hold tip to DPC R small finger = -1.5 pre and -1.0 post on 12/04/2023    AROM tip to DPC R small finger = -1.8 pre and -1.0 post on 12/4/2023    Treatment     Alexandr received the treatments listed below:      Supervised Modalities after being cleared for contradictions:   -Fluidotherapy x 15 min to L hand/arm  to decrease pain, desensitize, and increase tissue extensibility.       Direct Contact Modalities after being cleared for contraindications:   -    Manual Therapy Techniques were applied for 5 minutes, including:  - Gentle scar massage and mob 5 min   - Capsular massage PIPs  to L digits/hand/wrist x 3 min to stimulate lymphatics to decrease pain, edema and increase AROM and functional use.  (NT)   - Retrograde massage to L digits/hand/wrist x 2 min to stimulate lymphatics to decrease pain,  edema and increase AROM and functional use. (NT)     Therapeutic Exercises to develop  for  ROM, endurance, and/or strength for 17 min  -PROM L SF DIPJ E/F 25 reps   -PROM L SF PIPJ E/F 25 reps  -Passive stretch isolated and composite joints L SF  -gentle extension stretching SF   -place/hold 5 reps composite flexion   -AROM tendon glides 10x ea in fluido  -PIP / with MP flexed 20x (NT)     Therapeutic Activity: x 23  min   -opposition pick ups, flexed SF pick ups, in hand manipulation with medium pom poms, 1  container ea  -dowel grasp 3 sizes 20x ea  -DIPJ pulses around larger highlighter  -towel scrunches 10x   -vibration 3 min  -red flexbar rolling 3 min for scar mob and extension stretching      11/13/2023 SW monofilament testing with intact radial digital nerve ar 2.83 and ulnar digital nerve 2.83 intact to level of proximal PIP.      Patient Education and Home Exercises     Education provided:   - continue same.      Written Home Exercises Provided: Patient instructed to cont prior HEP. Tendon glides in splint to begin next date (handout provided)  Exercises were reviewed and Alexandr was able to demonstrate them prior to the end of the session.  Alexandr demonstrated good  understanding of the HEP provided. See EMR under Patient Instructions for exercises provided during therapy sessions.       Assessment     Pt would continue to benefit from skilled OT.  TPt is responding favorably to therapy. FOTO score has improved by 42 points. Edema has decreased.  Scar remains thickened and immobile. AROM is improving at SF.  Pt is now weaning out of orthosis and is allowed to perform light functional use of the hand. Gripping and heavy lifting remains contraindicated.  See below for goal achievement. AM increased at SF by 19 degrees. Progressed functional activities and extension stretching in clinic with good results. Pt able to touch SF tip to palm during session.     Alexandr is progressing well towards his goals and there are no updates to goals at this time.   - Progress towards goals: Good;     Pt prognosis is Good.    Pt will continue to benefit from skilled outpatient occupational therapy to address the deficits listed in the problem list on initial evaluation provide pt/family education and to maximize pt's level of independence in the home and community environment.     Pt's spiritual, cultural and educational needs considered and pt agreeable to plan of care and goals.    Anticipated barriers to occupational therapy: none      Goals:  Short Term Goals: (4 weeks)  1.Pt will be independent with HEP in 2 visits. (Met 11/8/23)  2. Pt will be compliant with splint wear per protocol. (Met 11/8/23)  3. Pt will report decreased pain to a 4-5/10 at worst once protocol initiated. (Met 12/18/23)   4. Pt will exhibit full PROM of L SF to DPC.  (Met 11/20/23)  5. Pt will  increase place/hold flexion LING by 10 -20 degrees at L SF to facilitate  future functional grasp once allowed post operatively.   6. Pt will increase LING L SF by 30-40 degrees once AROM initiated. (In progress 12/18/23)     Long Term Goals: (by discharge)  1. Pt will report decreased pain to 1-2/10 with ADLs.   2. Pt will make a full, flat, composite fist to enable grasping and squeezing objects for self-care.  3. Pt will exhibit L  strength at 75%+ of R to allow a firm grasp on cooking utensils, steering wheel, work tools, etc.  4. Pt will exhibit L functional pinch strength at 75%+ of R comparative measures to allow writing, opening containers, tieing knots, pulling drawstrings, and turning keys.  5. Pt will exhibit a significant increase (50+ points) in the FOTO assessment.  6. Pt will return to PLOF.     Plan     Certification Period/Plan of care expiration: 11/2/2023 to 1/31/2024.  Continue Occupational Therapy 3 times per week x 6 weeks to decrease pain and edema, and increase A/PROM, strength, and functional use of L upper extremity.     Updates/Grading for next session: progress per protocol.    IDALIA Salmeron, ANNE MARIET

## 2023-12-19 NOTE — PROGRESS NOTES
"  [facility]  Occupational Therapy Treatment Note      Date: 12/20/2023  Name: Elieser Kumar  Clinic Number: [id number]    Therapy Diagnosis:    Left hand pain, decreased ROM L hand, decreased /pinch/ADL/IADL  Physician: Eric Ortez MD     Physician Orders: Please begin therapy to the left hand and small finger.  Zone 2 flexor tendon repair protocol.  Please pay attention and protect the digital nerve repair as well.  Please create a thermoplastic custom dorsal blocking splint     Frequency:  3 times per week   Duration:  6 weeks      Diagnosis: Status post left small finger FDS and FDP repairs in zone 2      Medical Diagnosis:   S64.40XA (ICD-10-CM) - Laceration of digital nerve of finger, initial encounter   S56.129A,S61.209A (ICD-10-CM) - Flexor tendon laceration, finger, open wound, initial encounter      Evaluation Date: 11/2/2023  Insurance Authorization Period Expiration: 12/31/23  Plan of Care Certification Period: 1/31/2024  Date of Return to MD:    Visit # / Visits authorized:  11 / 18  FOTO: Second/46     Time In:  0939  Time Out:  1038  Total Appointment Time (timed & untimed codes):  59 minutes     Surgical Procedure:   L SF FDS, FDP, and UDN repair      Precautions: Standard and Weightbearing and flexor tendon precautions     Date of Surgery: 10/26/23                             S/P: 7 weeks 6 days  on 12/20/2023      Subjective     Pt reports: soreness after session today. "I can tell my whole hand is weak."       He was  compliant with home exercise program given last session.   Response to previous treatment:Good  Functional change: light ADLs-bathing, dressing,eating,     Pain:  Functional Pain Scale Rating 0-10:   0/10 now  0/10 at best  2/10 at worst , "aching"   Location: L SF into palm and wrist      Objective         small finger IP flexion  AROM measured this date (before tx)  Objective Measures updated at progress report unless specified.  Range of " Motion:            Left/ Right   Wrist E/F 60/65    Wrist RD/UD NT    Ring MP   Ring PIP  Ring DIP  0/80 0/100  0/95 0/104  0/28 0/80   Small MP  0/90 (+5)  0/95    Small PIP  24/72 (-1/-9) 0/95   Small DIP   LING SF 10/40 (-1/+5) 0/79  172 (NT)  264     Radial measurement SF DIP active flexion during session 60 degrees     Place/hold DIP flexion 40 degrees (NT)    Place and hold tip to DPC R small finger = -1.5 pre and -1.0 post on 12/04/2023    AROM tip to DPC R small finger = -1.8 pre and -1.0 post on 12/4/2023    Treatment     Alexandr received the treatments listed below:      Supervised Modalities after being cleared for contradictions:   -Fluidotherapy x 15 min to L hand/arm  to decrease pain, desensitize, and increase tissue extensibility.     Direct Contact Modalities after being cleared for contraindications:   -    Manual Therapy Techniques were applied for 5 minutes, including:  - Gentle scar massage and mob 5 min   - Capsular massage PIPs  to L digits/hand/wrist x 3 min to stimulate lymphatics to decrease pain, edema and increase AROM and functional use.  (NT)   - Retrograde massage to L digits/hand/wrist x 2 min to stimulate lymphatics to decrease pain,  edema and increase AROM and functional use. (NT)     Therapeutic Exercises to develop  for  ROM, endurance, and/or strength for 15 min  -PROM L SF DIPJ E/F 25 reps   -PROM L SF PIPJ E/F 25 reps  -Passive stretch isolated and composite joints L SF  -gentle extension stretching SF   -place/hold 5 reps composite flexion   -AROM tendon glides 10x ea in fluido  -PIP / with MP flexed 20x (NT)   -wrist PREs (add next)     Therapeutic Activity: x 24  min   -isospheres 3 min   -flexed SF pick ups, in hand manipulation with medium pom poms, 1 container ea  -dowel grasp 3 sizes 20x ea  -DIPJ pulses around larger highlighter  -towel scrunches 10x   -vibration 3 min   -tan putty rolling with functional pinches 30x ea  for scar mob and extension stretching  -medium  (beans) for gross grasp and for sensory re-ed 3 min         11/13/2023 SW monofilament testing with intact radial digital nerve ar 2.83 and ulnar digital nerve 2.83 intact to level of proximal PIP.      Patient Education and Home Exercises     Education provided:   - continue same, updated HEP to include light strengthening      Written Home Exercises Provided: Patient instructed to cont prior HEP. Exercises were reviewed and Alexandr was able to demonstrate them prior to the end of the session.  Alexandr demonstrated good  understanding of the HEP provided. See EMR under Patient Instructions for exercises provided during therapy sessions.       Assessment     Pt would continue to benefit from skilled OT.  Crepitus noted with active and passive gliding just distal to area of A1 pulley when palpated. Initiated light strengthening and upgraded HEP. Noted 5 degree increase in DIP flexion pre tx this date, but 9 degree decrease in PIP flexion. Excellent place/hold for composite flexion.     Alexandr is progressing well towards his goals and there are no updates to goals at this time.   - Progress towards goals: Good;     Pt prognosis is Good.    Pt will continue to benefit from skilled outpatient occupational therapy to address the deficits listed in the problem list on initial evaluation provide pt/family education and to maximize pt's level of independence in the home and community environment.     Pt's spiritual, cultural and educational needs considered and pt agreeable to plan of care and goals.    Anticipated barriers to occupational therapy: none     Goals:  Short Term Goals: (4 weeks)  1.Pt will be independent with HEP in 2 visits. (Met 11/8/23)  2. Pt will be compliant with splint wear per protocol. (Met 11/8/23)  3. Pt will report decreased pain to a 4-5/10 at worst once protocol initiated. (Met 12/18/23)   4. Pt will exhibit full PROM of L SF to DPC.  (Met 11/20/23)  5. Pt will  increase place/hold flexion LING by 10 -20  degrees at L SF to facilitate  future functional grasp once allowed post operatively.   6. Pt will increase LING L SF by 30-40 degrees once AROM initiated. (In progress 12/18/23)     Long Term Goals: (by discharge)  1. Pt will report decreased pain to 1-2/10 with ADLs.   2. Pt will make a full, flat, composite fist to enable grasping and squeezing objects for self-care.  3. Pt will exhibit L  strength at 75%+ of R to allow a firm grasp on cooking utensils, steering wheel, work tools, etc.  4. Pt will exhibit L functional pinch strength at 75%+ of R comparative measures to allow writing, opening containers, tieing knots, pulling drawstrings, and turning keys.  5. Pt will exhibit a significant increase (50+ points) in the FOTO assessment.  6. Pt will return to PLOF.     Plan     Certification Period/Plan of care expiration: 11/2/2023 to 1/31/2024.  Continue Occupational Therapy 3 times per week x 6 weeks to decrease pain and edema, and increase A/PROM, strength, and functional use of L upper extremity.     Updates/Grading for next session: progress per protocol.    IDALIA Salmeron, ANNE MARIET

## 2023-12-20 ENCOUNTER — CLINICAL SUPPORT (OUTPATIENT)
Dept: REHABILITATION | Facility: HOSPITAL | Age: 38
End: 2023-12-20
Payer: COMMERCIAL

## 2023-12-20 DIAGNOSIS — M79.642 HAND PAIN, LEFT: ICD-10-CM

## 2023-12-20 DIAGNOSIS — Z78.9 ALTERATION IN INSTRUMENTAL ACTIVITIES OF DAILY LIVING (IADL): Primary | ICD-10-CM

## 2023-12-20 DIAGNOSIS — R29.898 DECREASED GRIP STRENGTH OF LEFT HAND: ICD-10-CM

## 2023-12-20 DIAGNOSIS — M25.642 DECREASED RANGE OF MOTION OF FINGER OF LEFT HAND: ICD-10-CM

## 2023-12-20 DIAGNOSIS — R29.898 DECREASED PINCH STRENGTH: ICD-10-CM

## 2023-12-20 PROCEDURE — 97110 THERAPEUTIC EXERCISES: CPT | Mod: PO

## 2023-12-20 PROCEDURE — 97022 WHIRLPOOL THERAPY: CPT | Mod: PO

## 2023-12-20 PROCEDURE — 97530 THERAPEUTIC ACTIVITIES: CPT | Mod: PO

## 2023-12-21 NOTE — PROGRESS NOTES
"' [facility]  Occupational Therapy Treatment Note      Date: 12/26/2023  Name: Elieser Kumar  Clinic Number: [id number]    Therapy Diagnosis:    Left hand pain, decreased ROM L hand, decreased /pinch/ADL/IADL  Physician: Eric Ortez MD     Physician Orders: Please begin therapy to the left hand and small finger.  Zone 2 flexor tendon repair protocol.  Please pay attention and protect the digital nerve repair as well.  Please create a thermoplastic custom dorsal blocking splint     Frequency:  3 times per week   Duration:  6 weeks      Diagnosis: Status post left small finger FDS and FDP repairs in zone 2      Medical Diagnosis:   S64.40XA (ICD-10-CM) - Laceration of digital nerve of finger, initial encounter   S56.129A,S61.209A (ICD-10-CM) - Flexor tendon laceration, finger, open wound, initial encounter      Evaluation Date: 11/2/2023  Insurance Authorization Period Expiration: 12/31/23  Plan of Care Certification Period: 1/31/2024  Date of Return to MD:    Visit # / Visits authorized:  12 / 18  FOTO: Second/46     Time In:  0943  Time Out:  1042  Total Appointment Time (timed & untimed codes):  59 minutes     Surgical Procedure:   L SF FDS, FDP, and UDN repair      Precautions: Standard and Weightbearing and flexor tendon precautions     Date of Surgery: 10/26/23                             S/P: 8 weeks   on 12/21/2023      Subjective     Pt reports: he was sore from doing putty 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 Not wearing brace at work anymore.       He was  compliant with home exercise program given last session.   Response to previous treatment:Good  Functional change: work tasks    Pain:  Functional Pain Scale Rating 0-10:   0/10 now  0/10 at best  2/10 at worst , "aching"   Location: L SF into palm and wrist      Objective         AROM measured this date (before tx)  Objective Measures updated at progress report unless specified.  Range of Motion:     "        Left/ Right   Wrist E/F 60/65    Wrist RD/UD NT    Ring MP   Ring PIP  Ring DIP  0/80 0/100  0/95 0/104  0/28 0/80   Small MP  0/90 (+5)  0/95    Small PIP  24/86 (-1/+14) 0/95   Small DIP   LING SF 10/43(-1/+3) 0/79  185 (+13)  264     Radial measurement SF DIP active flexion during session 60 degrees     Place/hold DIP flexion 40 degrees (NT)    Place and hold tip to DPC R small finger = -1.5 pre and -1.0 post on 12/04/2023    AROM tip to DPC R small finger = -1.8 pre and -1.0 post on 12/4/2023    Treatment     Alexandr received the treatments listed below:      Supervised Modalities after being cleared for contradictions:   -Fluidotherapy x 10 min to L hand/arm  to decrease pain, desensitize, and increase tissue extensibility.     Direct Contact Modalities after being cleared for contraindications:   -    Manual Therapy Techniques were applied for 5 minutes, including:  - Gentle scar massage and mob 5 min   - Capsular massage PIPs  to L digits/hand/wrist x 3 min to stimulate lymphatics to decrease pain, edema and increase AROM and functional use.  (NT)   - Retrograde massage to L digits/hand/wrist x 2 min to stimulate lymphatics to decrease pain,  edema and increase AROM and functional use. (NT)     Therapeutic Exercises to develop  for  ROM, endurance, and/or strength for 20 min  -PROM L SF DIPJ E/F 25 reps   -PROM L SF PIPJ E/F 25 reps  -Passive stretch isolated and composite joints L SF  -gentle extension stretching SF   -place/hold 5 reps composite flexion (NT)   -AROM tendon glides 10x ea in fluido  -PIP / with MP flexed 20x (NT)   -wrist PREs 2# 3 ways over wedge 20x ea     Therapeutic Activity: x 24  min   -flexed SF pick ups, in hand manipulation with small pom poms, 1 container ea  -putty press with dowel and 2 highlighters   -DIPJ pulses around larger highlighter (NT)   -vibration 3 min (NT)  -opposition pinch with yellow CP 20x  -medium (beans) for gross grasp and for sensory re-ed 3 min (NT)    -finger drags in putty for PIPJ ext stretch   -tan putty squeezing 3 min   -intrinsic + isometrics with blue sponge 30x         11/13/2023 SW monofilament testing with intact radial digital nerve ar 2.83 and ulnar digital nerve 2.83 intact to level of proximal PIP.      Patient Education and Home Exercises     Education provided:   - continue same   Written Home Exercises Provided: Patient instructed to cont prior HEP. Exercises were reviewed and Alexandr was able to demonstrate them prior to the end of the session.  Alexandr demonstrated good  understanding of the HEP provided. See EMR under Patient Instructions for exercises provided during therapy sessions.       Assessment     Pt would continue to benefit from skilled OT.  Excellent fist formation this date. Progressed strengthening, which pt tolerated well. Continues with mild flexion contracture at PIPJ. LING increased 13 degrees.     Alexandr is progressing well towards his goals and there are no updates to goals at this time.   - Progress towards goals: Good;     Pt prognosis is Good.    Pt will continue to benefit from skilled outpatient occupational therapy to address the deficits listed in the problem list on initial evaluation provide pt/family education and to maximize pt's level of independence in the home and community environment.     Pt's spiritual, cultural and educational needs considered and pt agreeable to plan of care and goals.    Anticipated barriers to occupational therapy: none     Goals:  Short Term Goals: (4 weeks)  1.Pt will be independent with HEP in 2 visits. (Met 11/8/23)  2. Pt will be compliant with splint wear per protocol. (Met 11/8/23)  3. Pt will report decreased pain to a 4-5/10 at worst once protocol initiated. (Met 12/18/23)   4. Pt will exhibit full PROM of L SF to DPC.  (Met 11/20/23)  5. Pt will  increase place/hold flexion LING by 10 -20 degrees at L SF to facilitate  future functional grasp once allowed post operatively.   6. Pt  will increase LING L SF by 30-40 degrees once AROM initiated. (In progress 12/18/23)     Long Term Goals: (by discharge)  1. Pt will report decreased pain to 1-2/10 with ADLs.   2. Pt will make a full, flat, composite fist to enable grasping and squeezing objects for self-care.  3. Pt will exhibit L  strength at 75%+ of R to allow a firm grasp on cooking utensils, steering wheel, work tools, etc.  4. Pt will exhibit L functional pinch strength at 75%+ of R comparative measures to allow writing, opening containers, tieing knots, pulling drawstrings, and turning keys.  5. Pt will exhibit a significant increase (50+ points) in the FOTO assessment.  6. Pt will return to PLOF.     Plan     Certification Period/Plan of care expiration: 11/2/2023 to 1/31/2024.  Continue Occupational Therapy 3 times per week x 6 weeks to decrease pain and edema, and increase A/PROM, strength, and functional use of L upper extremity.     Updates/Grading for next session: progress per protocol.    IDALIA Salmeron, ANNE MARIET

## 2023-12-26 ENCOUNTER — CLINICAL SUPPORT (OUTPATIENT)
Dept: REHABILITATION | Facility: HOSPITAL | Age: 38
End: 2023-12-26
Payer: COMMERCIAL

## 2023-12-26 DIAGNOSIS — Z78.9 ALTERATION IN INSTRUMENTAL ACTIVITIES OF DAILY LIVING (IADL): Primary | ICD-10-CM

## 2023-12-26 DIAGNOSIS — R29.898 DECREASED PINCH STRENGTH: ICD-10-CM

## 2023-12-26 DIAGNOSIS — M79.642 HAND PAIN, LEFT: ICD-10-CM

## 2023-12-26 DIAGNOSIS — M25.642 DECREASED RANGE OF MOTION OF FINGER OF LEFT HAND: ICD-10-CM

## 2023-12-26 DIAGNOSIS — R29.898 DECREASED GRIP STRENGTH OF LEFT HAND: ICD-10-CM

## 2023-12-26 PROCEDURE — 97530 THERAPEUTIC ACTIVITIES: CPT | Mod: PO

## 2023-12-26 PROCEDURE — 97110 THERAPEUTIC EXERCISES: CPT | Mod: PO

## 2023-12-26 PROCEDURE — 97022 WHIRLPOOL THERAPY: CPT | Mod: PO

## 2023-12-28 NOTE — PROGRESS NOTES
"' [facility]  Occupational Therapy Treatment Note      Date: 1/2/2024  Name: Elieser Kumar  Clinic Number: [id number]    Therapy Diagnosis:    Left hand pain, decreased ROM L hand, decreased /pinch/ADL/IADL  Physician: Eric Ortez MD     Physician Orders: Please begin therapy to the left hand and small finger.  Zone 2 flexor tendon repair protocol.  Please pay attention and protect the digital nerve repair as well.  Please create a thermoplastic custom dorsal blocking splint     Frequency:  3 times per week   Duration:  6 weeks      Diagnosis: Status post left small finger FDS and FDP repairs in zone 2      Medical Diagnosis:   S64.40XA (ICD-10-CM) - Laceration of digital nerve of finger, initial encounter   S56.129A,S61.209A (ICD-10-CM) - Flexor tendon laceration, finger, open wound, initial encounter      Evaluation Date: 11/2/2023  Insurance Authorization Period Expiration: 12/31/23  Plan of Care Certification Period: 1/31/2024  Date of Return to MD:    Visit # / Visits authorized:  13 / 18  FOTO: Second/46     Time In:  0941  Time Out:  1045  Total Appointment Time (timed & untimed codes):  64 minutes     Surgical Procedure:   L SF FDS, FDP, and UDN repair      Precautions: Standard and Weightbearing and flexor tendon precautions     Date of Surgery: 10/26/23                             S/P: 9 weeks   on 12/28/2023      Subjective     Pt reports: has not has any problems and was traveling over the holidays. Sees the doctor tomorrow.       He was  compliant with home exercise program given last session.   Response to previous treatment:Good  Functional change: tie shoes     Pain:  Functional Pain Scale Rating 0-10:   0/10 now  0/10 at best  2/10 at worst , "aching"   Location: L SF into palm and wrist      Objective        Place/hold DIP flexion measured this date   Objective Measures updated at progress report unless specified.  Range of Motion:            Left/ Right "   Wrist E/F 60/65    Wrist RD/UD NT    Ring MP   Ring PIP  Ring DIP  0/80 0/100  0/95 0/104  0/28 0/80   Small MP  0/90 (+5)  0/95    Small PIP  24/86 (-1/+14) 0/95   Small DIP   LING SF 10/43(-1/+3) 0/79  185 (+13)  264     Radial measurement SF DIP active flexion during session 60 degrees     Place/hold DIP flexion 60 degrees (+20)    Place and hold tip to DPC R small finger = -1.5 pre and -1.0 post on 12/04/2023    AROM tip to DPC R small finger = -1.8 pre and -1.0 post on 12/4/2023    Treatment     Alexandr received the treatments listed below:      Supervised Modalities after being cleared for contradictions:   -Fluidotherapy x 15 min to L hand/arm  to decrease pain, desensitize, and increase tissue extensibility.     Direct Contact Modalities after being cleared for contraindications:   -    Manual Therapy Techniques were applied for 5 minutes, including:  - Gentle scar massage and mob 5 min   - Capsular massage PIPs  to L digits/hand/wrist x 3 min to stimulate lymphatics to decrease pain, edema and increase AROM and functional use.  (NT)       Therapeutic Exercises to develop  for  ROM, endurance, and/or strength for 15 min  -Passive stretch isolated and composite joints L SF  -gentle extension stretching SF   -place/hold 5 reps composite flexion   -AROM tendon glides 10x ea in fluido  -PIP / with MP flexed 20x (NT)   -wrist PREs 3# 3 ways over wedge 20x ea     Therapeutic Activity: x 21  min   -flexed SF pick ups, in hand manipulation with small pom poms, 1 container ea (NT)  -putty press with dowel and 2 highlighters   -DIPJ pulses around larger highlighter (NT)   -vibration 3 min (NT)  -opposition pinch with yellow CP 30x  -medium (mac) for gross grasp and for sensory re-ed 3 min; then beans and mac 3 min   -finger drags in putty for PIPJ ext stretch   -tan putty squeezing 3 min   -intrinsic + isometrics with blue sponge 30x   -resisted digit extension withlight blue handmaster plus   -calibrated gripper 15#  3x20     Orthotic Management and Training (93417) x 8 min:   -Pt fit with size C Deroyal dynamic PIP extension orthosis to address persistent flexion contracture at PIPJ. Pt educated to begin wearing for 5 min tid and to increase as able to 15-20 min. Pt educated on increasing and decreasing intensity of stretch, care, and monitoring blood flow.       11/13/2023 SW monofilament testing with intact radial digital nerve ar 2.83 and ulnar digital nerve 2.83 intact to level of proximal PIP.      Patient Education and Home Exercises     Education provided:   - continue same   Written Home Exercises Provided: Patient instructed to cont prior HEP. Exercises were reviewed and Alexandr was able to demonstrate them prior to the end of the session.  Alexandr demonstrated good  understanding of the HEP provided. See EMR under Patient Instructions for exercises provided during therapy sessions.       Assessment     Pt would continue to benefit from skilled OT.  Progressed strengthening, which pt tolerated well. Issued dynamic PIP extension orthosis to address PIP flexion contracture. DIP flex. Place/hold DIP flexion increased 20 degrees since last measurement and is equal to last active measure taken.     Alexandr is progressing well towards his goals and there are no updates to goals at this time.   - Progress towards goals: Good;     Pt prognosis is Good.    Pt will continue to benefit from skilled outpatient occupational therapy to address the deficits listed in the problem list on initial evaluation provide pt/family education and to maximize pt's level of independence in the home and community environment.     Pt's spiritual, cultural and educational needs considered and pt agreeable to plan of care and goals.    Anticipated barriers to occupational therapy: none     Goals:  Short Term Goals: (4 weeks)  1.Pt will be independent with HEP in 2 visits. (Met 11/8/23)  2. Pt will be compliant with splint wear per protocol. (Met 11/8/23)  3.  Pt will report decreased pain to a 4-5/10 at worst once protocol initiated. (Met 12/18/23)   4. Pt will exhibit full PROM of L SF to DPC.  (Met 11/20/23)  5. Pt will  increase place/hold flexion LING by 10 -20 degrees at L SF to facilitate  future functional grasp once allowed post operatively.   6. Pt will increase LING L SF by 30-40 degrees once AROM initiated. (In progress 12/18/23)     Long Term Goals: (by discharge)  1. Pt will report decreased pain to 1-2/10 with ADLs.   2. Pt will make a full, flat, composite fist to enable grasping and squeezing objects for self-care.  3. Pt will exhibit L  strength at 75%+ of R to allow a firm grasp on cooking utensils, steering wheel, work tools, etc.  4. Pt will exhibit L functional pinch strength at 75%+ of R comparative measures to allow writing, opening containers, tieing knots, pulling drawstrings, and turning keys.  5. Pt will exhibit a significant increase (50+ points) in the FOTO assessment.  6. Pt will return to PLOF.     Plan     Certification Period/Plan of care expiration: 11/2/2023 to 1/31/2024.  Continue Occupational Therapy 3 times per week x 6 weeks to decrease pain and edema, and increase A/PROM, strength, and functional use of L upper extremity.     Updates/Grading for next session: progress per protocol.    IDALIA Salmeron, ANNE MARIET

## 2024-01-02 ENCOUNTER — CLINICAL SUPPORT (OUTPATIENT)
Dept: REHABILITATION | Facility: HOSPITAL | Age: 39
End: 2024-01-02
Payer: COMMERCIAL

## 2024-01-02 DIAGNOSIS — M79.642 HAND PAIN, LEFT: ICD-10-CM

## 2024-01-02 DIAGNOSIS — M25.642 DECREASED RANGE OF MOTION OF FINGER OF LEFT HAND: ICD-10-CM

## 2024-01-02 DIAGNOSIS — R29.898 DECREASED GRIP STRENGTH OF LEFT HAND: ICD-10-CM

## 2024-01-02 DIAGNOSIS — Z78.9 ALTERATION IN INSTRUMENTAL ACTIVITIES OF DAILY LIVING (IADL): Primary | ICD-10-CM

## 2024-01-02 DIAGNOSIS — R29.898 DECREASED PINCH STRENGTH: ICD-10-CM

## 2024-01-02 PROCEDURE — 97530 THERAPEUTIC ACTIVITIES: CPT | Mod: PO

## 2024-01-02 PROCEDURE — 97110 THERAPEUTIC EXERCISES: CPT | Mod: 59,PO

## 2024-01-02 PROCEDURE — 97763 ORTHC/PROSTC MGMT SBSQ ENC: CPT | Mod: PO

## 2024-01-03 ENCOUNTER — OFFICE VISIT (OUTPATIENT)
Dept: ORTHOPEDICS | Facility: CLINIC | Age: 39
End: 2024-01-03
Payer: COMMERCIAL

## 2024-01-03 VITALS — HEIGHT: 73 IN | WEIGHT: 205 LBS | BODY MASS INDEX: 27.17 KG/M2

## 2024-01-03 DIAGNOSIS — S61.209A FLEXOR TENDON LACERATION, FINGER, OPEN WOUND, INITIAL ENCOUNTER: ICD-10-CM

## 2024-01-03 DIAGNOSIS — S56.129A FLEXOR TENDON LACERATION, FINGER, OPEN WOUND, INITIAL ENCOUNTER: ICD-10-CM

## 2024-01-03 DIAGNOSIS — S64.40XD LACERATION OF DIGITAL NERVE OF FINGER, SUBSEQUENT ENCOUNTER: Primary | ICD-10-CM

## 2024-01-03 PROCEDURE — 99999 PR PBB SHADOW E&M-EST. PATIENT-LVL III: CPT | Mod: PBBFAC,,, | Performed by: ORTHOPAEDIC SURGERY

## 2024-01-03 PROCEDURE — 1159F MED LIST DOCD IN RCRD: CPT | Mod: CPTII,S$GLB,, | Performed by: ORTHOPAEDIC SURGERY

## 2024-01-03 PROCEDURE — 99024 POSTOP FOLLOW-UP VISIT: CPT | Mod: S$GLB,,, | Performed by: ORTHOPAEDIC SURGERY

## 2024-01-03 NOTE — PROGRESS NOTES
"' [facility]  Occupational Therapy Treatment Note      Date: 1/4/2024  Name: Elieser Kumar  Clinic Number: [id number]    Therapy Diagnosis:    Left hand pain, decreased ROM L hand, decreased /pinch/ADL/IADL  Physician: Eric Ortez MD     Physician Orders: Please begin therapy to the left hand and small finger.  Zone 2 flexor tendon repair protocol.  Please pay attention and protect the digital nerve repair as well.  Please create a thermoplastic custom dorsal blocking splint     Frequency:  3 times per week   Duration:  6 weeks      Diagnosis: Status post left small finger FDS and FDP repairs in zone 2      Medical Diagnosis:   S64.40XA (ICD-10-CM) - Laceration of digital nerve of finger, initial encounter   S56.129A,S61.209A (ICD-10-CM) - Flexor tendon laceration, finger, open wound, initial encounter      Evaluation Date: 11/2/2023  Insurance Authorization Period Expiration: 12/31/23  Plan of Care Certification Period: 1/31/2024  Date of Return to MD:    Visit # / Visits authorized:  14 / 18  FOTO: Second/46     Time In:  0915  Time Out:  0986  Total Appointment Time (timed & untimed codes):  40 minutes     Surgical Procedure:   L SF FDS, FDP, and UDN repair      Precautions: Standard and Weightbearing and flexor tendon precautions     Date of Surgery: 10/26/23                             S/P: 10 weeks   on 1/4/2023      Subjective     Pt reports: doctor pleased with progress. Recommended pt continue therapy unless he plateaus. Doing well with dynamic PIP  extension orthosis.       He was  compliant with home exercise program given last session.   Response to previous treatment:Good; mild soreness until the day following last session.   Functional change:     Pain:  Functional Pain Scale Rating 0-10:   0/10 now  0/10 at best  2/10 at worst , "aching"   Location: L SF into palm and wrist      Objective       IPJ  flexion measured this date   Objective Measures updated at " progress report unless specified.  Range of Motion:            Left/ Right   Wrist E/F 60/65    Wrist RD/UD NT    Ring MP   Ring PIP  Ring DIP  0/80 0/100  0/95 0/104  0/28 0/80   Small MP  0/90 (NT)  0/95    Small PIP  24/85 (NT/-1) 0/95   Small DIP   LING SF 10/54(NT/+11) 0/79  185 (+13)  264     Radial measurement SF DIP active flexion during session 60 degrees     Place/hold DIP flexion 60 degrees (+20)    Place and hold tip to DPC R small finger = -1.5 pre and -1.0 post on 12/04/2023    AROM tip to DPC R small finger = -1.8 pre and -1.0 post on 12/4/2023    Treatment     Alexandr received the treatments listed below:      Supervised Modalities after being cleared for contradictions:   -Fluidotherapy x 0 min to L hand/arm  to decrease pain, desensitize, and increase tissue extensibility. (NT)     Direct Contact Modalities after being cleared for contraindications:   -    Manual Therapy Techniques were applied for 5 minutes, including:  - Gentle scar massage and mob 5 min with dycem  - Capsular massage PIPs  to L digits/hand/wrist x 3 min to stimulate lymphatics to decrease pain, edema and increase AROM and functional use.  (NT)       Therapeutic Exercises to develop  for  ROM, endurance, and/or strength for 15 min  -Passive stretch isolated and composite joints L SF  -gentle extension stretching SF   -place/hold 5 reps composite flexion   -AROM tendon glides 10x ea in fluido  -PIP / with MP flexed 20x (NT)   -wrist PREs 3# 3 ways over wedge 20x ea   -isolated FDS glides 10-20x ea finger   -active hook with scar retraction proximally and distally 10x ea     Therapeutic Activity: x 20  min   -flexed SF pick ups, in hand manipulation with small pom poms, 1 container ea (NT)  -putty press with dowel and 2 highlighters   -DIPJ pulses around larger highlighter (NT)   -vibration 3 min (NT)  -opposition pinch with yellow CP 30x  -medium (mac) for gross grasp and for sensory re-ed 3 min; then beans and mac 3 min   -finger  drags in putty for PIPJ ext stretch   -tan putty squeezing 3 min   -intrinsic + isometrics with blue sponge 30x   -resisted digit extension withlight red handmaster plus   -calibrated gripper 15# 3x20   -Hand helper 15# 3x20    11/13/2023 SW monofilament testing with intact radial digital nerve ar 2.83 and ulnar digital nerve 2.83 intact to level of proximal PIP.      Patient Education and Home Exercises     Education provided:   - continue same   Written Home Exercises Provided: Patient instructed to cont prior HEP. Exercises were reviewed and Alexandr was able to demonstrate them prior to the end of the session.  Alexandr demonstrated good  understanding of the HEP provided. See EMR under Patient Instructions for exercises provided during therapy sessions.       Assessment     Pt would continue to benefit from skilled OT.  Progresed program slightly, which pt tolerated wel. Active DIP flexion increased 11 degrees. Limited FDS excursion with glide noted. Fluido deferred this day at pt request.   Alexandr is progressing well towards his goals and there are no updates to goals at this time.   - Progress towards goals: Good;     Pt prognosis is Good.    Pt will continue to benefit from skilled outpatient occupational therapy to address the deficits listed in the problem list on initial evaluation provide pt/family education and to maximize pt's level of independence in the home and community environment.     Pt's spiritual, cultural and educational needs considered and pt agreeable to plan of care and goals.    Anticipated barriers to occupational therapy: none     Goals:  Short Term Goals: (4 weeks)  1.Pt will be independent with HEP in 2 visits. (Met 11/8/23)  2. Pt will be compliant with splint wear per protocol. (Met 11/8/23)  3. Pt will report decreased pain to a 4-5/10 at worst once protocol initiated. (Met 12/18/23)   4. Pt will exhibit full PROM of L SF to DPC.  (Met 11/20/23)  5. Pt will  increase place/hold flexion LING  by 10 -20 degrees at L SF to facilitate  future functional grasp once allowed post operatively.   6. Pt will increase LING L SF by 30-40 degrees once AROM initiated. (In progress 12/18/23)     Long Term Goals: (by discharge)  1. Pt will report decreased pain to 1-2/10 with ADLs.   2. Pt will make a full, flat, composite fist to enable grasping and squeezing objects for self-care. (In progress 1/4/234)  3. Pt will exhibit L  strength at 75%+ of R to allow a firm grasp on cooking utensils, steering wheel, work tools, etc.  4. Pt will exhibit L functional pinch strength at 75%+ of R comparative measures to allow writing, opening containers, tieing knots, pulling drawstrings, and turning keys.  5. Pt will exhibit a significant increase (50+ points) in the FOTO assessment.  6. Pt will return to PLOF.     Plan     Certification Period/Plan of care expiration: 11/2/2023 to 1/31/2024.  Continue Occupational Therapy 3 times per week x 6 weeks to decrease pain and edema, and increase A/PROM, strength, and functional use of L upper extremity.     Updates/Grading for next session: progress per protocol.    IDALIA Salmeron, ANNE MARIET

## 2024-01-03 NOTE — PROGRESS NOTES
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  He is working with therapy.  He has improve the significant amount.  He is still having some numbness on the finger.      Physical exam:  Examination of the left small finger reveals that the wounds are now well healed.  There is no significant edema.  Has only minimal scar tissue.  He is able to flex to the distal palmar crease but is missing half a cm of claw fist.  He is able to extend to within 5° of full extension at the IP joints.  He does have sensation intact over the radial side of the small finger but has decreased sensation over ulnar side.  There is a positive Tinel's at about the region laceration site.    Assessment: Status post left small finger flexor tendon repair in zone 2 and digital nerve repair    Plan:    1. He is approximately 9-1/2 weeks postop and therefore he can slowly progress.  We will hold him from heavier activities until he passes the three-month nikita.    2.  He can continue to work on his range of motion and edema control as well as scar massage     3. Will follow up in 4 weeks for final evaluation with me

## 2024-01-04 ENCOUNTER — CLINICAL SUPPORT (OUTPATIENT)
Dept: REHABILITATION | Facility: HOSPITAL | Age: 39
End: 2024-01-04
Payer: COMMERCIAL

## 2024-01-04 DIAGNOSIS — R29.898 DECREASED PINCH STRENGTH: ICD-10-CM

## 2024-01-04 DIAGNOSIS — M25.642 DECREASED RANGE OF MOTION OF FINGER OF LEFT HAND: ICD-10-CM

## 2024-01-04 DIAGNOSIS — Z78.9 ALTERATION IN INSTRUMENTAL ACTIVITIES OF DAILY LIVING (IADL): Primary | ICD-10-CM

## 2024-01-04 DIAGNOSIS — R29.898 DECREASED GRIP STRENGTH OF LEFT HAND: ICD-10-CM

## 2024-01-04 DIAGNOSIS — M79.642 HAND PAIN, LEFT: ICD-10-CM

## 2024-01-04 PROCEDURE — 97110 THERAPEUTIC EXERCISES: CPT | Mod: PO

## 2024-01-04 PROCEDURE — 97530 THERAPEUTIC ACTIVITIES: CPT | Mod: PO

## 2024-01-04 NOTE — PROGRESS NOTES
"' [facility]  Occupational Therapy Treatment Note      Date: 1/8/2024  Name: Elieser Kumar  Clinic Number: [id number]    Therapy Diagnosis:    Left hand pain, decreased ROM L hand, decreased /pinch/ADL/IADL  Physician: Eric Ortez MD     Physician Orders: Please begin therapy to the left hand and small finger.  Zone 2 flexor tendon repair protocol.  Please pay attention and protect the digital nerve repair as well.  Please create a thermoplastic custom dorsal blocking splint     Frequency:  3 times per week   Duration:  6 weeks      Diagnosis: Status post left small finger FDS and FDP repairs in zone 2      Medical Diagnosis:   S64.40XA (ICD-10-CM) - Laceration of digital nerve of finger, initial encounter   S56.129A,S61.209A (ICD-10-CM) - Flexor tendon laceration, finger, open wound, initial encounter      Evaluation Date: 11/2/2023  Insurance Authorization Period Expiration: 12/31/23  Plan of Care Certification Period: 1/31/2024  Date of Return to MD:    Visit # / Visits authorized:  15 / 18  FOTO: Second/46     Time In:  0915  Time Out:  1015  Total Appointment Time (timed & untimed codes):  60 minutes     Surgical Procedure:   L SF FDS, FDP, and UDN repair      Precautions: Standard and Weightbearing and flexor tendon precautions     Date of Surgery: 10/26/23                             S/P: 10 weeks   on 1/4/2023      Subjective     Pt reports: no new complaints. Feels like sensation is improving. Hand was a little more swollen over the weekend.  Able to wear extension splint for 15 min increments.       He was  compliant with home exercise program given last session.   Response to previous treatment:Good; mild soreness until the day following last session.   Functional change:     Pain:  Functional Pain Scale Rating 0-10:   0/10 now  0/10 at best  2/10 at worst , "aching"   Location: L SF into palm and wrist      Objective       RF and SF AROM measured this date "   Objective Measures updated at progress report unless specified.  Range of Motion:            Left/ Right   Wrist E/F 60/65    Wrist RD/UD NT    Ring MP   Ring PIP  Ring DIP  0/90 (+10) 0/100  0/101 (+6)  0/104  0/69 (+41) 0/80   Small MP  0/90 (NT)  0/95    Small PIP  16/90 (+8/+5) 0/95   Small DIP   LING SF 0/63(+10/+19) 0/79  227 (+42)  264     Radial measurement SF DIP active flexion during session 60 degrees     Place/hold DIP flexion 60 degrees (+20)    Place and hold tip to DPC R small finger = -1.5 pre and -1.0 post on 12/04/2023    AROM tip to DPC R small finger = -1.8 pre and -1.0 post on 12/4/2023    Treatment     Alexandr received the treatments listed below:      Supervised Modalities after being cleared for contradictions:   -Fluidotherapy x 15 min to L hand/arm  to decrease pain, desensitize, and increase tissue extensibility.     Direct Contact Modalities after being cleared for contraindications:   -    Manual Therapy Techniques were applied for 5 minutes, including:  - Gentle scar massage and mob 5 min   - Capsular massage PIPs  to L digits/hand/wrist x 3 min to stimulate lymphatics to decrease pain, edema and increase AROM and functional use.  (NT)       Therapeutic Exercises to develop  for  ROM, endurance, and/or strength for 25 min  -Passive stretch isolated and composite joints L SF  -gentle extension stretching SF   -place/hold 5 reps composite flexion  (NT)   -AROM tendon glides 10x ea in fluido  -wrist PREs 3# 3 ways over wedge 20x ea   -isolated FDS glides 10-20x ea finger   -active hook with scar retraction proximally and distally 10x ea (NT)    Therapeutic Activity: x 20  min   -putty press with dowel and 2 highlighters   -vibration 3 min (NT)  -opposition pinch with red  CP 30x  -medium (rice) for gross grasp and for sensory re-ed 3 min; then all 4 particles  3 min   -finger drags in putty for PIPJ ext stretch (NT)   -tan putty squeezing 3 min  (NT)  -hook fist in tan putty 30x   -intrinsic  + isometrics with blue sponge 30x   -resisted digit extension withlight red handmaster plus   -calibrated gripper 25# 3x20   -Hand helper 25# 3x20  -red flexbar up and down 20x ea     11/13/2023 SW monofilament testing with intact radial digital nerve ar 2.83 and ulnar digital nerve 2.83 intact to level of proximal PIP.      Patient Education and Home Exercises     Education provided:   - continue same   Written Home Exercises Provided: Patient instructed to cont prior HEP. Exercises were reviewed and Alexandr was able to demonstrate them prior to the end of the session.  Alexandr demonstrated good  understanding of the HEP provided. See EMR under Patient Instructions for exercises provided during therapy sessions.       Assessment     Pt would continue to benefit from skilled OT.  Progressed resistance levels on grippers and added flexbar and resisted IP flexion in putty this date. Pt performed all tasks without complaint. LING increased 42 degrees at SF, including improvements in extension. RF AROM is nearing WNL.   - Progress towards goals: Good;     Pt prognosis is Good.    Pt will continue to benefit from skilled outpatient occupational therapy to address the deficits listed in the problem list on initial evaluation provide pt/family education and to maximize pt's level of independence in the home and community environment.     Pt's spiritual, cultural and educational needs considered and pt agreeable to plan of care and goals.    Anticipated barriers to occupational therapy: none     Goals:  Short Term Goals: (4 weeks)  1.Pt will be independent with HEP in 2 visits. (Met 11/8/23)  2. Pt will be compliant with splint wear per protocol. (Met 11/8/23)  3. Pt will report decreased pain to a 4-5/10 at worst once protocol initiated. (Met 12/18/23)   4. Pt will exhibit full PROM of L SF to DPC.  (Met 11/20/23)  5. Pt will  increase place/hold flexion LING by 10 -20 degrees at L SF to facilitate  future functional grasp once  allowed post operatively.   6. Pt will increase LING L SF by 30-40 degrees once AROM initiated. (Met 1/8/2023)     Long Term Goals: (by discharge)  1. Pt will report decreased pain to 1-2/10 with ADLs.   2. Pt will make a full, flat, composite fist to enable grasping and squeezing objects for self-care. (In progress 1/4/234)  3. Pt will exhibit L  strength at 75%+ of R to allow a firm grasp on cooking utensils, steering wheel, work tools, etc.  4. Pt will exhibit L functional pinch strength at 75%+ of R comparative measures to allow writing, opening containers, tieing knots, pulling drawstrings, and turning keys.  5. Pt will exhibit a significant increase (50+ points) in the FOTO assessment.  6. Pt will return to PLOF.     Plan     Certification Period/Plan of care expiration: 11/2/2023 to 1/31/2024.  Continue Occupational Therapy 3 times per week x 6 weeks to decrease pain and edema, and increase A/PROM, strength, and functional use of L upper extremity.     Updates/Grading for next session: progress per protocol.    IDALIA Salmeron, ANNE MARIET

## 2024-01-08 ENCOUNTER — CLINICAL SUPPORT (OUTPATIENT)
Dept: REHABILITATION | Facility: HOSPITAL | Age: 39
End: 2024-01-08
Payer: COMMERCIAL

## 2024-01-08 DIAGNOSIS — R29.898 DECREASED GRIP STRENGTH OF LEFT HAND: ICD-10-CM

## 2024-01-08 DIAGNOSIS — R29.898 DECREASED PINCH STRENGTH: ICD-10-CM

## 2024-01-08 DIAGNOSIS — M25.642 DECREASED RANGE OF MOTION OF FINGER OF LEFT HAND: ICD-10-CM

## 2024-01-08 DIAGNOSIS — Z78.9 ALTERATION IN INSTRUMENTAL ACTIVITIES OF DAILY LIVING (IADL): Primary | ICD-10-CM

## 2024-01-08 DIAGNOSIS — M79.642 HAND PAIN, LEFT: ICD-10-CM

## 2024-01-08 PROCEDURE — 97110 THERAPEUTIC EXERCISES: CPT | Mod: PO

## 2024-01-08 PROCEDURE — 97530 THERAPEUTIC ACTIVITIES: CPT | Mod: PO

## 2024-01-08 PROCEDURE — 97022 WHIRLPOOL THERAPY: CPT | Mod: PO

## 2024-01-09 ENCOUNTER — TELEPHONE (OUTPATIENT)
Dept: ORTHOPEDICS | Facility: CLINIC | Age: 39
End: 2024-01-09
Payer: COMMERCIAL

## 2024-01-09 DIAGNOSIS — S56.129A FLEXOR TENDON LACERATION, FINGER, OPEN WOUND, INITIAL ENCOUNTER: ICD-10-CM

## 2024-01-09 DIAGNOSIS — S64.40XD LACERATION OF DIGITAL NERVE OF FINGER, SUBSEQUENT ENCOUNTER: Primary | ICD-10-CM

## 2024-01-09 DIAGNOSIS — S61.209A FLEXOR TENDON LACERATION, FINGER, OPEN WOUND, INITIAL ENCOUNTER: ICD-10-CM

## 2024-01-09 NOTE — PROGRESS NOTES
"' [facility]  Occupational Therapy Treatment Note      Date: 1/10/2024  Name: Elieser Kumar  Clinic Number: [id number]    Therapy Diagnosis:    Left hand pain, decreased ROM L hand, decreased /pinch/ADL/IADL  Physician: Eric Ortez MD     Physician Orders: Please begin therapy to the left hand and small finger.  Zone 2 flexor tendon repair protocol.  Please pay attention and protect the digital nerve repair as well.  Please create a thermoplastic custom dorsal blocking splint     Frequency:  3 times per week   Duration:  6 weeks      Diagnosis: Status post left small finger FDS and FDP repairs in zone 2      Medical Diagnosis:   S64.40XA (ICD-10-CM) - Laceration of digital nerve of finger, initial encounter   S56.129A,S61.209A (ICD-10-CM) - Flexor tendon laceration, finger, open wound, initial encounter      Evaluation Date: 11/2/2023  Insurance Authorization Period Expiration: 12/31/23  Plan of Care Certification Period: 1/31/2024  Date of Return to MD:    Visit # / Visits authorized:  16 / 18  FOTO: Second/46     Time In:  0905  Time Out:  1005  Total Appointment Time (timed & untimed codes):  60 minutes     Surgical Procedure:   L SF FDS, FDP, and UDN repair      Precautions: Standard and Weightbearing and flexor tendon precautions     Date of Surgery: 10/26/23                             S/P: 10 weeks   on 1/4/2023      Subjective     Pt reports: feels that crepitus with motion in area of laceration is decreasing.     He was  compliant with home exercise program given last session.   Response to previous treatment:Good; mild soreness until the day following last session.   Functional change:     Pain:  Functional Pain Scale Rating 0-10:   0/10 now  0/10 at best  2/10 at worst , "aching"   Location: L SF into palm and wrist      Objective       Not  measured this date   Objective Measures updated at progress report unless specified.  Range of Motion:            Left/ " Right   Wrist E/F 60/65    Wrist RD/UD NT    Ring MP   Ring PIP  Ring DIP  0/90 (+10) 0/100  0/101 (+6)  0/104  0/69 (+41) 0/80   Small MP  0/90 (NT)  0/95    Small PIP  16/90 (+8/+5) 0/95   Small DIP   LING SF 0/63(+10/+19) 0/79  227 (+42)  264     Radial measurement SF DIP active flexion during session 60 degrees     Place/hold DIP flexion 60 degrees (+20)    Place and hold tip to DPC R small finger = -1.5 pre and -1.0 post on 12/04/2023    AROM tip to DPC R small finger = -1.8 pre and -1.0 post on 12/4/2023    Treatment     Alexandr received the treatments listed below:      Supervised Modalities after being cleared for contradictions:   -Fluidotherapy x 15 min to L hand/arm  to decrease pain, desensitize, and increase tissue extensibility.     Direct Contact Modalities after being cleared for contraindications:   -    Manual Therapy Techniques were applied for 5 minutes, including:  - Gentle scar massage and mob 5 min   - Capsular massage PIPs  to L digits/hand/wrist x 3 min to stimulate lymphatics to decrease pain, edema and increase AROM and functional use.  (NT)       Therapeutic Exercises to develop  for  ROM, endurance, and/or strength for 15 min  -Passive stretch isolated and composite joints L SF  -gentle extension stretching SF   -place/hold 5 reps composite flexion  (NT)   -AROM tendon glides 10x ea in fluido  -wrist PREs 4# 3 ways over wedge 20x ea   -isolated FDS glides 10-20x ea finger (NT)   -active hook with scar retraction proximally and distally 10x ea (NT)    Therapeutic Activity: x 25  min   -putty press with dowel and 2 highlighters   -vibration 3 min (NT)  -opposition pinch with red  CP 30x  -medium (rice) for gross grasp and for sensory re-ed all 4 particles  3 min   -finger drags in putty for PIPJ ext stretch (NT)   -tan putty squeezing 3 min  (NT)  -hook fist in tan putty 30x   -intrinsic + isometrics with blue sponge 30x   -resisted digit extension with light red handmaster plus   -calibrated  gripper 35# 3x20   -Hand helper 35# 3x20  -red flexbar up and down 20x ea   -Large Highlighter rolling in IP flexion    11/13/2023 SW monofilament testing with intact radial digital nerve ar 2.83 and ulnar digital nerve 2.83 intact to level of proximal PIP.      Patient Education and Home Exercises     Education provided:   - continue same   Written Home Exercises Provided: Patient instructed to cont prior HEP. Exercises were reviewed and Alexandr was able to demonstrate them prior to the end of the session.  Alexandr demonstrated good  understanding of the HEP provided. See EMR under Patient Instructions for exercises provided during therapy sessions.       Assessment     Pt would continue to benefit from skilled OT.  Continue to progress resistance levels, which pt has tolerated well. Continues with excellent small finger flexion to palm.   - Progress towards goals: Good;     Pt prognosis is Good.    Pt will continue to benefit from skilled outpatient occupational therapy to address the deficits listed in the problem list on initial evaluation provide pt/family education and to maximize pt's level of independence in the home and community environment.     Pt's spiritual, cultural and educational needs considered and pt agreeable to plan of care and goals.    Anticipated barriers to occupational therapy: none     Goals:  Short Term Goals: (4 weeks)  1.Pt will be independent with HEP in 2 visits. (Met 11/8/23)  2. Pt will be compliant with splint wear per protocol. (Met 11/8/23)  3. Pt will report decreased pain to a 4-5/10 at worst once protocol initiated. (Met 12/18/23)   4. Pt will exhibit full PROM of L SF to DPC.  (Met 11/20/23)  5. Pt will  increase place/hold flexion LING by 10 -20 degrees at L SF to facilitate  future functional grasp once allowed post operatively.   6. Pt will increase LING L SF by 30-40 degrees once AROM initiated. (Met 1/8/2023)     Long Term Goals: (by discharge)  1. Pt will report decreased pain  to 1-2/10 with ADLs.   2. Pt will make a full, flat, composite fist to enable grasping and squeezing objects for self-care. (In progress 1/4/234)  3. Pt will exhibit L  strength at 75%+ of R to allow a firm grasp on cooking utensils, steering wheel, work tools, etc.  4. Pt will exhibit L functional pinch strength at 75%+ of R comparative measures to allow writing, opening containers, tieing knots, pulling drawstrings, and turning keys.  5. Pt will exhibit a significant increase (50+ points) in the FOTO assessment.  6. Pt will return to PLOF.     Plan     Certification Period/Plan of care expiration: 11/2/2023 to 1/31/2024.  Continue Occupational Therapy 3 times per week x 6 weeks to decrease pain and edema, and increase A/PROM, strength, and functional use of L upper extremity.     Updates/Grading for next session: progress per protocol.    IDALIA Salmeron, ANNE MARIET

## 2024-01-10 ENCOUNTER — PATIENT MESSAGE (OUTPATIENT)
Dept: REHABILITATION | Facility: HOSPITAL | Age: 39
End: 2024-01-10

## 2024-01-10 ENCOUNTER — CLINICAL SUPPORT (OUTPATIENT)
Dept: REHABILITATION | Facility: HOSPITAL | Age: 39
End: 2024-01-10
Payer: COMMERCIAL

## 2024-01-10 DIAGNOSIS — M79.642 HAND PAIN, LEFT: ICD-10-CM

## 2024-01-10 DIAGNOSIS — R29.898 DECREASED PINCH STRENGTH: ICD-10-CM

## 2024-01-10 DIAGNOSIS — Z78.9 ALTERATION IN INSTRUMENTAL ACTIVITIES OF DAILY LIVING (IADL): Primary | ICD-10-CM

## 2024-01-10 DIAGNOSIS — R29.898 DECREASED GRIP STRENGTH OF LEFT HAND: ICD-10-CM

## 2024-01-10 DIAGNOSIS — M25.642 DECREASED RANGE OF MOTION OF FINGER OF LEFT HAND: ICD-10-CM

## 2024-01-10 PROCEDURE — 97110 THERAPEUTIC EXERCISES: CPT | Mod: PO

## 2024-01-10 PROCEDURE — 97530 THERAPEUTIC ACTIVITIES: CPT | Mod: PO

## 2024-01-10 PROCEDURE — 97022 WHIRLPOOL THERAPY: CPT | Mod: PO

## 2024-01-22 NOTE — PROGRESS NOTES
"' [facility]  Occupational Therapy Treatment Note / Updated POC     Date: 1/23/2024  Name: Elieser Kumar  Clinic Number: [id number]    Therapy Diagnosis:    Left hand pain, decreased ROM L hand, decreased /pinch/ADL/IADL  Physician: Eric Ortez MD     Physician Orders: Please begin therapy to the left hand and small finger.  Zone 2 flexor tendon repair protocol.  Please pay attention and protect the digital nerve repair as well.  Please create a thermoplastic custom dorsal blocking splint     Frequency:  3 times per week   Duration:  6 weeks      Diagnosis: Status post left small finger FDS and FDP repairs in zone 2      Medical Diagnosis:   S64.40XA (ICD-10-CM) - Laceration of digital nerve of finger, initial encounter   S56.129A,S61.209A (ICD-10-CM) - Flexor tendon laceration, finger, open wound, initial encounter      Evaluation Date: 11/2/2023  Insurance Authorization Period Expiration: 12/31/23  Plan of Care Certification Period: 1/31/2024  Date of Return to MD:    Visit # / Visits authorized:  17 / 18  FOTO: Second/46     Time In:  0937  Time Out:  1037  Total Appointment Time (timed & untimed codes):  60 minutes     Surgical Procedure:   L SF FDS, FDP, and UDN repair      Precautions: Standard and Weightbearing and flexor tendon precautions     Date of Surgery: 10/26/23                             S/P: 12 weeks   on 1/18/2023      Subjective     Pt reports: feels that he was making progress every time he came to therapy and was disappointed by the mixup with insurance approval/authorization. States that coming to therapy is an incentive to work his hand harder.    He was  compliant with home exercise program given last session.   Response to previous treatment:Good  Functional change: was able to do a central line     Pain:  Functional Pain Scale Rating 0-10:   0/10 now  0/10 at best  2/10 at worst , "aching"   Location: L SF into palm and wrist      Objective "       Pt has had 17 OT visits. Treatment has consisted of moist heat, fluidotherapy, scar mobilization, edema control, A/PROM, passive stretch, there ex, strengthening, orthotic fabrication.     Objective Measures updated at progress report unless specified.  AROM measured this date for RF DIP flexion, SF all joints, and baseline  and pinch measures taken.  Range of Motion:            Left/ Right   Wrist E/F 60/65    Wrist RD/UD NT    Ring MP   Ring PIP  Ring DIP  0/90 (+10)     0/100  0/101 (+6)     0/104  0/70 (+1)     0/80   Small MP  0/90 (NT)       0/95    Small PIP  14/90 (+2/=)  0/95   Small DIP   LING SF 0/60(WNL/-3)  0/79  226        264     Radial measurement SF DIP active flexion during session 60 degrees     AROM tip to DPC R small finger = -1.8 pre and -1.0 post on 12/4/2023     and Pinch Strength (in pounds, psi's):   Left Right    II      III 66 110   Lateral 19.5 25   Tripod 16 25   Tip 13 14         Treatment     Alexandr received the treatments listed below:      Supervised Modalities after being cleared for contradictions:   -Fluidotherapy x 15 min to L hand/arm  to decrease pain, desensitize, and increase tissue extensibility.     Direct Contact Modalities after being cleared for contraindications:   -    Manual Therapy Techniques were applied for 5 minutes, including:  - Gentle scar massage and mob 3 min   - Capsular massage PIPs  to L digits/hand/wrist x 3 min to stimulate lymphatics to decrease pain, edema and increase AROM and functional use.  (NT)   -A/P glides at L SF PIPJ 2 min     Therapeutic Exercises to develop  for  ROM, endurance, and/or strength for 15 min  -Passive stretch isolated and composite joints L SF  -extension stretching SF   -place/hold 5 reps composite flexion  (NT)   -AROM tendon glides 10x ea in fluido  -wrist PREs 5# 3 ways over wedge 20x ea   -isolated FDS glides 10-20x ea finger (NT)   -active hook with scar retraction proximally and distally 10x ea  (NT)    Therapeutic Activity: x 25  min   -putty press with dowel and 2 highlighters   -vibration 3 min (NT)  -opposition pinch with red  CP 30x  -medium (rice) for gross grasp and for sensory re-ed all 4 particles  3 min (NT)   -finger drags in putty for PIPJ ext stretch (NT)   -tan putty squeezing 3 min  (NT)  -hook fist in tan putty 30x   -intrinsic + isometrics with blue sponge 30x   -resisted abd/add with 2 RB and pink sponges 20x eea  -resisted digit extension with light red handmaster plus   -calibrated gripper 35# 3x20   -Hand helper 35# 3x20 (NT)   -green flexbar up and down 20x ea   -Large Highlighter rolling in IP flexion    11/13/2023 SW monofilament testing with intact radial digital nerve ar 2.83 and ulnar digital nerve 2.83 intact to level of proximal PIP.      Patient Education and Home Exercises     Education provided:   - continue same   Written Home Exercises Provided: Patient instructed to cont prior HEP. Exercises were reviewed and Alexandr was able to demonstrate them prior to the end of the session.  Alexandr demonstrated good  understanding of the HEP provided. See EMR under Patient Instructions for exercises provided during therapy sessions.       Assessment   Progressed resistance levels on wrist PREs and flexbar activity.     Pt would continue to benefit from skilled OT. Edema has decreased significantly and is currently mild at L SF.  Scar mobility and thickness has decreased and is also now currently minimal. AROM continues to improve as does flexion contracture at PIPJ. Pt continues with mild to moderate decreases in PIP and DIP flexion at RF and SF.  Baseline  and pinch measures taken this date. L  is 60% of contralateral R non- dominant side. All pinches are decreased and range from 64% to 92% of R hand. See below for goal achievement. Pt would benefit from continued occupational therapy services to increase A/PROM, strength, and functional use.     - Progress towards goals: Good;      Pt prognosis is Good.    Pt will continue to benefit from skilled outpatient occupational therapy to address the deficits listed in the problem list on initial evaluation provide pt/family education and to maximize pt's level of independence in the home and community environment.     Pt's spiritual, cultural and educational needs considered and pt agreeable to plan of care and goals.    Anticipated barriers to occupational therapy: none     Goals:  Short Term Goals: (4 weeks)  1.Pt will be independent with HEP in 2 visits. (Met 11/8/23)  2. Pt will be compliant with splint wear per protocol. (Met 11/8/23)  3. Pt will report decreased pain to a 4-5/10 at worst once protocol initiated. (Met 12/18/23)   4. Pt will exhibit full PROM of L SF to DPC.  (Met 11/20/23)  5. Pt will  increase place/hold flexion LING by 10 -20 degrees at L SF to facilitate  future functional grasp once allowed post operatively. (Met)   6. Pt will increase LING L SF by 30-40 degrees once AROM initiated. (Met 1/8/2023)     Updated STGs:  Pt will exhibit increased L SF PIPJ extension to 5-10 degrees.   Pt will increase RF and SF DIP flexion by 10 degrees to enable tight fist formation when performing medical procedures.     Long Term Goals: (by discharge) (Remains appropriate as of 1/23/2024)   1. Pt will report decreased pain to 1-2/10 with ADLs. (Met 1/23/24)   2. Pt will make a full, flat, composite fist to enable grasping and squeezing objects for self-care. (In progress 1/4/24)  3. Pt will exhibit L  strength at 75%+ of R to allow a firm grasp on cooking utensils, steering wheel, work tools, etc.  4. Pt will exhibit L functional pinch strength at 75%+ of R comparative measures to allow writing, opening containers, tieing knots, pulling drawstrings, and turning keys.   5. Pt will exhibit a significant increase (50+ points) in the FOTO assessment.  6. Pt will return to PLOF.     Plan     Certification Period/Plan of care expiration:  11/2/2023 to 1/31/2024. Extend to 3/1/2024    Continue Occupational Therapy 2 times per week x 6 weeks to decrease pain and edema, and increase A/PROM, strength, and functional use of L upper extremity.     Updates/Grading for next session: progress per protocol.    IDALIA Salmeron, ANNE MARIET

## 2024-01-23 ENCOUNTER — CLINICAL SUPPORT (OUTPATIENT)
Dept: REHABILITATION | Facility: HOSPITAL | Age: 39
End: 2024-01-23
Payer: COMMERCIAL

## 2024-01-23 DIAGNOSIS — R29.898 DECREASED GRIP STRENGTH OF LEFT HAND: ICD-10-CM

## 2024-01-23 DIAGNOSIS — M25.642 DECREASED RANGE OF MOTION OF FINGER OF LEFT HAND: ICD-10-CM

## 2024-01-23 DIAGNOSIS — Z78.9 ALTERATION IN INSTRUMENTAL ACTIVITIES OF DAILY LIVING (IADL): Primary | ICD-10-CM

## 2024-01-23 DIAGNOSIS — M79.642 HAND PAIN, LEFT: ICD-10-CM

## 2024-01-23 DIAGNOSIS — R29.898 DECREASED PINCH STRENGTH: ICD-10-CM

## 2024-01-23 PROCEDURE — 97022 WHIRLPOOL THERAPY: CPT | Mod: PO

## 2024-01-23 PROCEDURE — 97530 THERAPEUTIC ACTIVITIES: CPT | Mod: PO

## 2024-01-23 PROCEDURE — 97110 THERAPEUTIC EXERCISES: CPT | Mod: PO

## 2024-01-25 NOTE — PROGRESS NOTES
"' [facility]  Occupational Therapy Treatment Note / Updated POC     Date: 1/29/2024  Name: Elieser Kumar  Clinic Number: [id number]    Therapy Diagnosis:    Left hand pain, decreased ROM L hand, decreased /pinch/ADL/IADL  Physician: Eric Ortez MD     Physician Orders: Please begin therapy to the left hand and small finger.  Zone 2 flexor tendon repair protocol.  Please pay attention and protect the digital nerve repair as well.  Please create a thermoplastic custom dorsal blocking splint     Frequency:  3 times per week   Duration:  6 weeks      Diagnosis: Status post left small finger FDS and FDP repairs in zone 2      Medical Diagnosis:   S64.40XA (ICD-10-CM) - Laceration of digital nerve of finger, initial encounter   S56.129A,S61.209A (ICD-10-CM) - Flexor tendon laceration, finger, open wound, initial encounter      Evaluation Date: 11/2/2023  Insurance Authorization Period Expiration: 4/1/2024  Plan of Care Certification Period: 1/31/2024  Date of Return to MD:    Visit # / Visits authorized:  18 / 18  FOTO: Second/46     Time In:  0906  Time Out:  1003  Total Appointment Time (timed & untimed codes):  57 minutes     Surgical Procedure:   L SF FDS, FDP, and UDN repair      Precautions: Standard and Weightbearing and flexor tendon precautions     Date of Surgery: 10/26/23                             S/P: 12 weeks   on 1/18/2023      Subjective     Pt reports: today is the first time he was able to touch palm with fingertip independently.    He was  compliant with home exercise program given last session.   Response to previous treatment:Good  Functional change: none reported     Pain:  Functional Pain Scale Rating 0-10:   0/10 now  0/10 at best  2/10 at worst , "aching"   Location: L SF into palm and wrist      Objective       Objective Measures updated at progress report unless specified.  AROM measured this date for RF DIP flexion, SF all joints, and baseline  " and pinch measures taken.  Range of Motion:            Left/ Right   Wrist E/F 60/65    Wrist RD/UD NT    Ring MP   Ring PIP  Ring DIP  0/90 (+10)     0/100  0/101 (+6)     0/104  0/70 (+1)     0/80   Small MP  0/90 (=)       0/95    Small PIP  14/100 (NT/+10)  0/95   Small DIP   LING SF 0/65(WNL/+5)  0/79  226        264     Radial measurement SF DIP active flexion during session 60 degrees     AROM tip to DPC R small finger = -1.8 pre and -1.0 post on 12/4/2023     and Pinch Strength (in pounds, psi's):   Left Right    II      III 66 110   Lateral 19.5 25   Tripod 16 25   Tip 13 14     [place] 3.61 (Diminished Light Touch) at ulnar SF tip  2 PD: 3 mm on R ulnar SF tip and L radial SF tip  11mm on L ulnar SF tip (able to discern 8 mm if points placed on an angle across pad of finger)    Treatment     Alexandr received the treatments listed below:      Supervised Modalities after being cleared for contradictions:   -Fluidotherapy x 15 min to L hand/arm  to decrease pain, desensitize, and increase tissue extensibility.     Direct Contact Modalities after being cleared for contraindications:   -    Manual Therapy Techniques were applied for 5 minutes, including:  - Gentle scar massage and mob 3 min   - Capsular massage PIPs  to L digits/hand/wrist x 3 min to stimulate lymphatics to decrease pain, edema and increase AROM and functional use.  (NT)   -A/P glides at L SF PIPJ 2 min     Therapeutic Exercises to develop  for  ROM, endurance, and/or strength for 10 min  -Passive stretch isolated and composite joints L SF  -extension stretching SF   -place/hold 5 reps composite flexion  (NT)   -AROM tendon glides 10x ea in fluido  -wrist PREs 5# 3 ways over wedge 20x ea   -isolated FDS glides 10-20x ea finger (NT)   -active hook with scar retraction proximally and distally 10x ea (NT)    Therapeutic Activity: x 27  min   -putty press with dowel and 2 highlighters   -vibration 3 min (NT)  -opposition pinch with  red  CP 30x  -medium (rice) for gross grasp and for sensory re-ed all 4 particles  3 min   -finger drags in putty for PIPJ ext stretch (NT)   -tan putty squeezing 3 min  (NT)  -hook fist in tan putty 30x   -intrinsic + isometrics with blue sponge 30x   -resisted abd/add with 2 RB and pink sponges 20x eea  -resisted digit extension with light red handmaster plus   -calibrated gripper 35# 3x20   -Hand helper 35# 3x20 (NT)   -green flexbar up and down 20x ea   -Large Highlighter rolling in IP flexion    11/13/2023 SW monofilament testing with intact radial digital nerve ar 2.83 and ulnar digital nerve 2.83 intact to level of proximal PIP.      Patient Education and Home Exercises     Education provided:   - continue same   Written Home Exercises Provided: Patient instructed to cont prior HEP. Exercises were reviewed and Alexandr was able to demonstrate them prior to the end of the session.  Alexandr demonstrated good  understanding of the HEP provided. See EMR under Patient Instructions for exercises provided during therapy sessions.       Assessment    Pt would benefit from continued occupational therapy services to increase A/PROM, strength, and functional use. Sensation assessed this date, withSF ulnar tip with diminished light touch but 8-11 mm with 2 point discrimination. IP flexion increased 15 degrees this date and exhibits excellent composite flexion.   - Progress towards goals: Good; LTG 2 met    Pt prognosis is Good.    Pt will continue to benefit from skilled outpatient occupational therapy to address the deficits listed in the problem list on initial evaluation provide pt/family education and to maximize pt's level of independence in the home and community environment.     Pt's spiritual, cultural and educational needs considered and pt agreeable to plan of care and goals.    Anticipated barriers to occupational therapy: none     Goals:  Short Term Goals: (4 weeks)  1.Pt will be independent with HEP in 2 visits.  (Met 11/8/23)  2. Pt will be compliant with splint wear per protocol. (Met 11/8/23)  3. Pt will report decreased pain to a 4-5/10 at worst once protocol initiated. (Met 12/18/23)   4. Pt will exhibit full PROM of L SF to DPC.  (Met 11/20/23)  5. Pt will  increase place/hold flexion LING by 10 -20 degrees at L SF to facilitate  future functional grasp once allowed post operatively. (Met)   6. Pt will increase LING L SF by 30-40 degrees once AROM initiated. (Met 1/8/2023)     Updated STGs:  Pt will exhibit increased L SF PIPJ extension to 5-10 degrees.   Pt will increase RF and SF DIP flexion by 10 degrees to enable tight fist formation when performing medical procedures.     Long Term Goals: (by discharge) (Remains appropriate as of 1/23/2024)   1. Pt will report decreased pain to 1-2/10 with ADLs. (Met 1/23/24)   2. Pt will make a full, flat, composite fist to enable grasping and squeezing objects for self-care. (Met 1/29/24)  3. Pt will exhibit L  strength at 75%+ of R to allow a firm grasp on cooking utensils, steering wheel, work tools, etc.  4. Pt will exhibit L functional pinch strength at 75%+ of R comparative measures to allow writing, opening containers, tieing knots, pulling drawstrings, and turning keys.   5. Pt will exhibit a significant increase (50+ points) in the FOTO assessment.  6. Pt will return to PLOF.     Plan     Certification Period/Plan of care expiration: 11/2/2023 to 1/31/2024. Extend to 3/1/2024    Continue Occupational Therapy 2 times per week x 6 weeks to decrease pain and edema, and increase A/PROM, strength, and functional use of L upper extremity.     Updates/Grading for next session: progress per protocol.    IDALIA Salmeron, ANNE MARIET

## 2024-01-29 ENCOUNTER — CLINICAL SUPPORT (OUTPATIENT)
Dept: REHABILITATION | Facility: HOSPITAL | Age: 39
End: 2024-01-29
Payer: COMMERCIAL

## 2024-01-29 DIAGNOSIS — M25.642 DECREASED RANGE OF MOTION OF FINGER OF LEFT HAND: ICD-10-CM

## 2024-01-29 DIAGNOSIS — R29.898 DECREASED GRIP STRENGTH OF LEFT HAND: ICD-10-CM

## 2024-01-29 DIAGNOSIS — Z78.9 ALTERATION IN INSTRUMENTAL ACTIVITIES OF DAILY LIVING (IADL): Primary | ICD-10-CM

## 2024-01-29 DIAGNOSIS — M79.642 HAND PAIN, LEFT: ICD-10-CM

## 2024-01-29 DIAGNOSIS — R29.898 DECREASED PINCH STRENGTH: ICD-10-CM

## 2024-01-29 PROCEDURE — 97110 THERAPEUTIC EXERCISES: CPT | Mod: PO

## 2024-01-29 PROCEDURE — 97530 THERAPEUTIC ACTIVITIES: CPT | Mod: PO

## 2024-01-29 PROCEDURE — 97022 WHIRLPOOL THERAPY: CPT | Mod: PO

## 2024-02-01 ENCOUNTER — PATIENT MESSAGE (OUTPATIENT)
Dept: REHABILITATION | Facility: HOSPITAL | Age: 39
End: 2024-02-01
Payer: COMMERCIAL

## 2024-02-02 ENCOUNTER — PATIENT MESSAGE (OUTPATIENT)
Dept: REHABILITATION | Facility: HOSPITAL | Age: 39
End: 2024-02-02
Payer: COMMERCIAL

## 2024-02-05 ENCOUNTER — OFFICE VISIT (OUTPATIENT)
Dept: ORTHOPEDICS | Facility: CLINIC | Age: 39
End: 2024-02-05
Payer: COMMERCIAL

## 2024-02-05 VITALS — HEIGHT: 73 IN | BODY MASS INDEX: 27.17 KG/M2 | WEIGHT: 205 LBS

## 2024-02-05 DIAGNOSIS — S61.209A FLEXOR TENDON LACERATION, FINGER, OPEN WOUND, INITIAL ENCOUNTER: Primary | ICD-10-CM

## 2024-02-05 DIAGNOSIS — S64.40XD LACERATION OF DIGITAL NERVE OF FINGER, SUBSEQUENT ENCOUNTER: ICD-10-CM

## 2024-02-05 DIAGNOSIS — S56.129A FLEXOR TENDON LACERATION, FINGER, OPEN WOUND, INITIAL ENCOUNTER: Primary | ICD-10-CM

## 2024-02-05 PROCEDURE — 1159F MED LIST DOCD IN RCRD: CPT | Mod: CPTII,S$GLB,, | Performed by: ORTHOPAEDIC SURGERY

## 2024-02-05 PROCEDURE — 99999 PR PBB SHADOW E&M-EST. PATIENT-LVL III: CPT | Mod: PBBFAC,,, | Performed by: ORTHOPAEDIC SURGERY

## 2024-02-05 PROCEDURE — 99024 POSTOP FOLLOW-UP VISIT: CPT | Mod: S$GLB,,, | Performed by: ORTHOPAEDIC SURGERY

## 2024-02-05 NOTE — PROGRESS NOTES
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  Has a history of left small finger zone 2 tendon repairs as well as digital nerve repair.  He continues to improve.  He has a little will be 3 months from his injury.  He has no new complaints.      Physical exam:  Examination of the left hand and small finger reveals that the wounds are well healed.  There is minimal scar.  There is no significant edema.  He is able to flex to the distal palmar crease.  He still has a 5-10 degree extensor lag at the PIP joint.  He does have sensation intact over the radial side of the digit and he does have protective sensation on the ulnar side of the small finger     Assessment:  Status post left small finger flexor tendon repair and digital nerve repair    Plan:    1. Will continue to progress his weight-bearing through the left hand.  At the six-month niktia he will be cleared to do any activity that he would like to do    2.  If he has any further concerns questions or complications arise he can follow-up with me at that time

## 2024-02-12 NOTE — PROGRESS NOTES
"' [facility]  Occupational Therapy Treatment Note / Updated POC     Date: 2/14/2024  Name: Elieser Kumar  Clinic Number: [id number]    Therapy Diagnosis:    Left hand pain, decreased ROM L hand, decreased /pinch/ADL/IADL  Physician: Eric Ortez MD     Physician Orders: Please begin therapy to the left hand and small finger.  Zone 2 flexor tendon repair protocol.  Please pay attention and protect the digital nerve repair as well.  Please create a thermoplastic custom dorsal blocking splint     Frequency:  3 times per week   Duration:  6 weeks      Diagnosis: Status post left small finger FDS and FDP repairs in zone 2      Medical Diagnosis:   S64.40XA (ICD-10-CM) - Laceration of digital nerve of finger, initial encounter   S56.129A,S61.209A (ICD-10-CM) - Flexor tendon laceration, finger, open wound, initial encounter      Evaluation Date: 11/2/2023  Insurance Authorization Period Expiration: 4/1/2024  Plan of Care Certification Period: 1/31/2024  Date of Return to MD:    Visit # / Visits authorized:  19 / 25   (7 of 14 in 2024)   FOTO: Second/46     Time In:  0900  Time Out:  0945  Total Appointment Time (timed & untimed codes):  45 minutes     Surgical Procedure:   L SF FDS, FDP, and UDN repair      Precautions: Standard and Weightbearing and flexor tendon precautions     Date of Surgery: 10/26/23                             S/P: 16 weeks   on 2/15/2023      Subjective     Pt reports: the doctor was very pleased with his progress. He has been returning to higher level activity and has tolerated it without pain. The surgeon also said that PIP flexion contracture may not get 100% better as the tendon was shortened slightly. m   He was  compliant with home exercise program given last session.   Response to previous treatment:Good  Functional change: gym workouts and golf    Pain:  Functional Pain Scale Rating 0-10:   0/10 now  0/10 at best  2/10 at worst , "aching" "   Location: L SF into palm and wrist      Objective       Objective Measures updated at progress report unless specified.  AROM measured this date for L SF all joints, and 2 PD  Range of Motion:            Left/ Right   Wrist E/F 60/65    Wrist RD/UD NT    Ring MP   Ring PIP  Ring DIP  0/90 (+10)     0/100  0/101 (+6)     0/104  0/70 (+1)     0/80   Small MP  0/90 (=)       0/95    Small PIP  20/95 (-6/+5)  0/95   Small DIP   LING SF 0/75(WNL/+10)  0/79  240          264     Radial measurement SF DIP active flexion during session 60 degrees     AROM tip to DPC R small finger = -1.8 pre and -1.0 post on 12/4/2023     and Pinch Strength (in pounds, psi's):   Left Right    II      III 66 110   Lateral 19.5 25   Tripod 16 25   Tip 13 14       Treatment     Alexandr received the treatments listed below:      Supervised Modalities after being cleared for contradictions:   -Fluidotherapy x 15 min to L hand/arm  to decrease pain, desensitize, and increase tissue extensibility. (NT)      Direct Contact Modalities after being cleared for contraindications:   -    Manual Therapy Techniques were applied for 5 minutes, including:  - Gentle scar massage and mob 3 min   - Capsular massage PIPs  to L digits/hand/wrist x 3 min to stimulate lymphatics to decrease pain, edema and increase AROM and functional use.  (NT)   -A/P glides at L SF PIPJ 2 min     Therapeutic Exercises to develop  for  ROM, endurance, and/or strength for 15 min  -Passive stretch isolated and composite joints L SF  -extension stretching SF   -place/hold 5 reps composite flexion  (NT)   -AROM tendon glides 10x ea in fluido (NT)   -wrist PREs 5# 3 ways over wedge 20x ea   -isolated FDS glides 10-20x ea finger (NT)   -active hook with scar retraction proximally and distally 10x ea (NT)    Therapeutic Activity: x 25  min   -putty press with dowel and 2 highlighters (NT)   -vibration 3 min (NT)  -opposition pinch with red  CP 30x  -medium (rice) for gross  grasp and for sensory re-ed all 4 particles  3 min   -finger drags in putty for PIPJ ext stretch (NT)   -pink putty squeezing 3 min    -hook fist in pink putty 30x   -intrinsic + isometrics with tan putty 30x   -resisted abd/add with 2 RB and pink sponges 20x ea (NT)   -resisted digit extension with light red handmaster plus (NT)   -calibrated gripper 55# 3x20   -Hand helper 35# 3x20 (NT)   -blue flexbar up and down 20x ea   -Large Highlighter rolling in IP flexion (NT)          Patient Education and Home Exercises     Education provided:   - continue same   Written Home Exercises Provided: Patient instructed to cont prior HEP. Exercises were reviewed and Alexandr was able to demonstrate them prior to the end of the session.  Alexandr demonstrated good  understanding of the HEP provided. See EMR under Patient Instructions for exercises provided during therapy sessions.       Assessment    Pt would benefit from continued occupational therapy services to increase A/PROM, strength, and functional use. 2 pt discrimination reassessed this date with pt able to discern 7 mm on slight angle. R hand was 5 mm today, not 3 mm as previously.  IP flexion increased an additional 10 degrees this date and is within 4 degrees of contralateral hand. Progressed on all levels of resistance without complaint.   - Progress towards goals: Good; LTG 2 met    Pt prognosis is Good.    Pt will continue to benefit from skilled outpatient occupational therapy to address the deficits listed in the problem list on initial evaluation provide pt/family education and to maximize pt's level of independence in the home and community environment.     Pt's spiritual, cultural and educational needs considered and pt agreeable to plan of care and goals.    Anticipated barriers to occupational therapy: none     Goals:  Short Term Goals: (4 weeks)  1.Pt will be independent with HEP in 2 visits. (Met 11/8/23)  2. Pt will be compliant with splint wear per protocol.  (Met 11/8/23)  3. Pt will report decreased pain to a 4-5/10 at worst once protocol initiated. (Met 12/18/23)   4. Pt will exhibit full PROM of L SF to DPC.  (Met 11/20/23)  5. Pt will  increase place/hold flexion LING by 10 -20 degrees at L SF to facilitate  future functional grasp once allowed post operatively. (Met)   6. Pt will increase LING L SF by 30-40 degrees once AROM initiated. (Met 1/8/2023)     Updated STGs:  Pt will exhibit increased L SF PIPJ extension to 5-10 degrees.   Pt will increase RF and SF DIP flexion by 10 degrees to enable tight fist formation when performing medical procedures. (Met 2/14/24)      Long Term Goals: (by discharge) (Remains appropriate as of 1/23/2024)   1. Pt will report decreased pain to 1-2/10 with ADLs. (Met 1/23/24)   2. Pt will make a full, flat, composite fist to enable grasping and squeezing objects for self-care. (Met 1/29/24)  3. Pt will exhibit L  strength at 75%+ of R to allow a firm grasp on cooking utensils, steering wheel, work tools, etc.  4. Pt will exhibit L functional pinch strength at 75%+ of R comparative measures to allow writing, opening containers, tieing knots, pulling drawstrings, and turning keys.   5. Pt will exhibit a significant increase (50+ points) in the FOTO assessment.  6. Pt will return to PLOF.     Plan     Certification Period/Plan of care expiration: 11/2/2023 to 1/31/2024. Extend to 3/1/2024    Continue Occupational Therapy 2 times per week x 6 weeks to decrease pain and edema, and increase A/PROM, strength, and functional use of L upper extremity.     Updates/Grading for next session: progress per protocol.    IDALIA Salmeron, ANNE MARIET

## 2024-02-14 ENCOUNTER — CLINICAL SUPPORT (OUTPATIENT)
Dept: REHABILITATION | Facility: HOSPITAL | Age: 39
End: 2024-02-14
Payer: COMMERCIAL

## 2024-02-14 DIAGNOSIS — R29.898 DECREASED PINCH STRENGTH: ICD-10-CM

## 2024-02-14 DIAGNOSIS — R29.898 DECREASED GRIP STRENGTH OF LEFT HAND: ICD-10-CM

## 2024-02-14 DIAGNOSIS — M25.642 DECREASED RANGE OF MOTION OF FINGER OF LEFT HAND: ICD-10-CM

## 2024-02-14 DIAGNOSIS — Z78.9 ALTERATION IN INSTRUMENTAL ACTIVITIES OF DAILY LIVING (IADL): Primary | ICD-10-CM

## 2024-02-14 DIAGNOSIS — M79.642 HAND PAIN, LEFT: ICD-10-CM

## 2024-02-14 PROCEDURE — 97110 THERAPEUTIC EXERCISES: CPT | Mod: PO

## 2024-02-14 PROCEDURE — 97530 THERAPEUTIC ACTIVITIES: CPT | Mod: PO

## 2024-02-21 ENCOUNTER — CLINICAL SUPPORT (OUTPATIENT)
Dept: REHABILITATION | Facility: HOSPITAL | Age: 39
End: 2024-02-21
Payer: COMMERCIAL

## 2024-02-21 DIAGNOSIS — M25.642 DECREASED RANGE OF MOTION OF FINGER OF LEFT HAND: ICD-10-CM

## 2024-02-21 DIAGNOSIS — Z78.9 ALTERATION IN INSTRUMENTAL ACTIVITIES OF DAILY LIVING (IADL): Primary | ICD-10-CM

## 2024-02-21 DIAGNOSIS — R29.898 DECREASED PINCH STRENGTH: ICD-10-CM

## 2024-02-21 DIAGNOSIS — R29.898 DECREASED GRIP STRENGTH OF LEFT HAND: ICD-10-CM

## 2024-02-21 DIAGNOSIS — M79.642 HAND PAIN, LEFT: ICD-10-CM

## 2024-02-21 PROCEDURE — 97110 THERAPEUTIC EXERCISES: CPT | Mod: PO

## 2024-02-21 PROCEDURE — 97018 PARAFFIN BATH THERAPY: CPT | Mod: 59,PO

## 2024-02-21 PROCEDURE — 97535 SELF CARE MNGMENT TRAINING: CPT | Mod: PO

## 2024-02-21 PROCEDURE — 97140 MANUAL THERAPY 1/> REGIONS: CPT | Mod: PO

## 2024-02-21 NOTE — PROGRESS NOTES
"' [facility]  Occupational Therapy Discharge and Treatment Note      Date: 2/21/2024  Name: Elieser Kumar  Clinic Number: [id number]    Therapy Diagnosis:    Left hand pain, decreased ROM L hand, decreased /pinch/ADL/IADL  Physician: Eric Ortez MD     Physician Orders: Please begin therapy to the left hand and small finger.  Zone 2 flexor tendon repair protocol.  Please pay attention and protect the digital nerve repair as well.  Please create a thermoplastic custom dorsal blocking splint     Frequency:  3 times per week   Duration:  6 weeks      Diagnosis: Status post left small finger FDS and FDP repairs in zone 2      Medical Diagnosis:   S64.40XA (ICD-10-CM) - Laceration of digital nerve of finger, initial encounter   S56.129A,S61.209A (ICD-10-CM) - Flexor tendon laceration, finger, open wound, initial encounter      Evaluation Date: 11/2/2023  Insurance Authorization Period Expiration: 4/1/2024  Plan of Care Certification Period: 1/31/2024  Date of Return to MD:  PRN  Visit # / Visits authorized:  20 / 25   (8 of 14 in 2024)   FOTO: Second/46 and third (D/C) = 98     Time In:  0858  Time Out: 0943  Total Appointment Time: 45 min  (timed 40 min & untimed codes: Paraffin):     Surgical Procedure:   L SF FDS, FDP, and UDN repair      Precautions: Standard and Weightbearing and flexor tendon precautions     Date of Surgery: 10/26/23                             S/P: 16 weeks   on 2/15/2023      Subjective     Pt reports: No problems, has been able to use his L hand well for all activities  He was  compliant with home exercise program given last session.   Response to previous treatment:Good  Functional change: gym workouts and golf    Pain:  Functional Pain Scale Rating 0-10:   0/10 now  0/10 at best  2/10 at worst , "aching"   Location: L SF into palm and wrist      Objective       Objective Measures updated at progress report unless specified.  AROM measured this date " for L SF all joints, and 2 PD  Range of Motion:            Left/ Right   Wrist E/F 60/65    Wrist RD/UD NT    Ring MP   Ring PIP  Ring DIP  0/90 (+10)     0/100  0/101 (+6)     0/104  0/70 (+1)     0/80   Small MP  0/90 (=)       0/95    Small PIP  5/95 (-+15/0)  0/95   On 2/21/2024   Small DIP   LING SF 0/75(WNL/+0)  0/79  On 2/21/2024  240          264     Radial measurement SF DIP active flexion during session 60 degrees     AROM tip to DPC R small finger = -1.8 pre and -1.0 post on 12/4/2023     and Pinch Strength (in pounds, psi's):    Left Right    II       (66 last measurement)On 2/21/2024 110   Lateral 19.5 25   Tripod 16 25   Tip 13 14       Treatment     Alexandr received the treatments listed below:      Supervised Modalities after being cleared for contradictions:   -Fluidotherapy x 15 min to L hand/arm  to decrease pain, desensitize, and increase tissue extensibility. (NT)    - Paraffin 10 min at 125 degrees with hand I composite ext and propped don 3# DB, Also propped L hand in hook fist x 5 min post removal    Direct Contact Modalities after being cleared for contraindications:   - NT    Manual Therapy Techniques were applied for 10 minutes, including:  - Gentle scar massage and mob 3 min   - Capsular massage PIPs  to L digits/hand/wrist x 3 min to stimulate lymphatics to decrease pain, edema and increase AROM and functional use.  (NT)   -A/P glides at L SF PIPJ 2 min     Therapeutic Exercises to develop  for  ROM, endurance, and/or strength for 20 min  - Passive stretch with paraffin wax applied see above   - AAROM straight to hook fist with scott loops donned   - Tenodesis pattern x25    Self care Ed/mgmt: Scott strap use increase ADL performance, benefits of paraffin use each AM to decrease pain, moisturize soften scar and increase soft tissue mobility. 10 min     Patient Education and Home Exercises     Education provided:   - continue same  Consider paraffin wax each am and Scott  loop use prn for golf/sport.    Written Home Exercises Provided: Patient instructed to cont prior HEP. Exercises were reviewed and Alexandr was able to demonstrate them prior to the end of the session.  Alexandr demonstrated good  understanding of the HEP provided. See EMR under Patient Instructions for exercises provided during therapy sessions.       Assessment   Pt is pleased with current outcome and has met all goals. Pt ready for D/C this date.    Goals:  Short Term Goals: (4 weeks)  1.Pt will be independent with HEP in 2 visits. (Met 11/8/23)  2. Pt will be compliant with splint wear per protocol. (Met 11/8/23)  3. Pt will report decreased pain to a 4-5/10 at worst once protocol initiated. (Met 12/18/23)   4. Pt will exhibit full PROM of L SF to DPC.  (Met 11/20/23)  5. Pt will  increase place/hold flexion LING by 10 -20 degrees at L SF to facilitate  future functional grasp once allowed post operatively. (Met)   6. Pt will increase LING L SF by 30-40 degrees once AROM initiated. (Met 1/8/2023)     Updated STGs:  Pt will exhibit increased L SF PIPJ extension to 5-10 degrees.  MET  Pt will increase RF and SF DIP flexion by 10 degrees to enable tight fist formation when performing medical procedures. (Met 2/14/24)      Long Term Goals: (by discharge) All Goals MET  1. Pt will report decreased pain to 1-2/10 with ADLs. (Met 1/23/24)   2. Pt will make a full, flat, composite fist to enable grasping and squeezing objects for self-care. (Met 1/29/24)  3. Pt will exhibit L  strength at 75%+ of R to allow a firm grasp on cooking utensils, steering wheel, work tools, etc.  4. Pt will exhibit L functional pinch strength at 75%+ of R comparative measures to allow writing, opening containers, tieing knots, pulling drawstrings, and turning keys.   5. Pt will exhibit a significant increase (50+ points) in the FOTO assessment.  6. Pt will return to PLOF.     Plan   Pt for D/C from OT services this date. Pt. to cont HEP as  tolerated and will follow up with referring provider for any further tx needs.      IDALIA Grimaldo/TON

## (undated) DEVICE — GAUZE SPONGE 4X4 12PLY

## (undated) DEVICE — PENCIL ROCKER SWITCH 10FT CORD

## (undated) DEVICE — SPONGE LAP 18X18 PREWASHED

## (undated) DEVICE — SEE L#120831

## (undated) DEVICE — SUT PROLENE TF 5-0 24IN

## (undated) DEVICE — NDL HYPO 27G X 1 1/2

## (undated) DEVICE — BANDAGE MATRIX HK LOOP 4IN 5YD

## (undated) DEVICE — BOWL STERILE LARGE 32OZ

## (undated) DEVICE — CORD BIPOLAR 12 FOOT

## (undated) DEVICE — ALCOHOL 70% ISOP RUBBING 4OZ

## (undated) DEVICE — DRESSING XEROFORM FOIL PK 1X8

## (undated) DEVICE — DRAPE HAND STERILE

## (undated) DEVICE — Device

## (undated) DEVICE — TUBING SUC UNIV W/CONN 12FT

## (undated) DEVICE — FORCEP STRAIGHT DISP

## (undated) DEVICE — GOWN SMARTGOWN LVL4 X-LONG XL

## (undated) DEVICE — BANDAGE ESMARK LATEX FREE 4INX

## (undated) DEVICE — PADDING CAST 4IN SPECIALIST

## (undated) DEVICE — DRAPE STERI-DRAPE 1000 17X11IN

## (undated) DEVICE — SUT 8/0 5IN ETHILON BLK MO

## (undated) DEVICE — SYR 10CC LUER LOCK

## (undated) DEVICE — STRAP OR TABLE 5IN X 72IN

## (undated) DEVICE — ELECTRODE REM PLYHSV RETURN 9

## (undated) DEVICE — KIT SAHARA DRAPE DRAW/LIFT

## (undated) DEVICE — SUT ETHIBOND 3-0 RB1 30IN

## (undated) DEVICE — TOURNIQUET SB QC DP 18X4IN

## (undated) DEVICE — GLOVE PROTEXIS LTX MICRO  7.5

## (undated) DEVICE — DRAPE THREE-QTR REINF 53X77IN

## (undated) DEVICE — GLOVE PROTEXIS LTX MICRO 8

## (undated) DEVICE — YANKAUER OPEN TIP W/O VENT

## (undated) DEVICE — APPLICATOR CHLORAPREP ORN 26ML